# Patient Record
Sex: MALE | Race: WHITE | NOT HISPANIC OR LATINO | URBAN - METROPOLITAN AREA
[De-identification: names, ages, dates, MRNs, and addresses within clinical notes are randomized per-mention and may not be internally consistent; named-entity substitution may affect disease eponyms.]

---

## 2019-01-12 ENCOUNTER — EMERGENCY (EMERGENCY)
Facility: HOSPITAL | Age: 59
LOS: 1 days | Discharge: HOME | End: 2019-01-12
Attending: EMERGENCY MEDICINE
Payer: COMMERCIAL

## 2019-01-12 VITALS
HEIGHT: 73 IN | DIASTOLIC BLOOD PRESSURE: 109 MMHG | HEART RATE: 75 BPM | WEIGHT: 265 LBS | RESPIRATION RATE: 18 BRPM | OXYGEN SATURATION: 98 % | SYSTOLIC BLOOD PRESSURE: 177 MMHG | TEMPERATURE: 98 F

## 2019-01-12 VITALS
HEART RATE: 76 BPM | RESPIRATION RATE: 16 BRPM | OXYGEN SATURATION: 98 % | SYSTOLIC BLOOD PRESSURE: 162 MMHG | DIASTOLIC BLOOD PRESSURE: 99 MMHG

## 2019-01-12 PROCEDURE — 73610 X-RAY EXAM OF ANKLE: CPT | Mod: 26,LT

## 2019-01-12 PROCEDURE — 76882 US LMTD JT/FCL EVL NVASC XTR: CPT | Mod: 26,LT

## 2019-01-12 PROCEDURE — 99284 EMERGENCY DEPT VISIT MOD MDM: CPT | Mod: 25

## 2019-01-12 PROCEDURE — 99284 EMERGENCY DEPT VISIT MOD MDM: CPT

## 2019-01-12 PROCEDURE — 76882 US LMTD JT/FCL EVL NVASC XTR: CPT

## 2019-01-12 PROCEDURE — 73610 X-RAY EXAM OF ANKLE: CPT

## 2019-01-12 NOTE — ED PROVIDER NOTE - NS ED ROS FT
Constitutional: denies fevers, chills, night sweats, weight loss  HEENT: denies visual changes, hearing changes, rhinitis, odynophagia, or dysphagia  Cardiovascular: denies palpitations, chest pain, edema  Respiratory: denies SOB, wheezing  Gastrointestinal: denies N/V/D, abdominal pain, hematochezia, melena  : denies dysuria, hematuria  MSK: + L heel pain, - back pain,  Neuro: no numbness or tingling  Psych: no depression or anxiety  Skin: denies new rashes or masses

## 2019-01-12 NOTE — ED ADULT NURSE NOTE - OBJECTIVE STATEMENT
Seen by, treated by MD Hema Negron Seen by, treated by,  discharged by  MD Hema Negron Seen by, treated by,  discharged by  MD Hema Negron and ED MD Declan Linda with no nursing intervention needed

## 2019-01-12 NOTE — ED PROVIDER NOTE - MEDICAL DECISION MAKING DETAILS
Jamil PGY1- 58 yoM, HTN, L heel pain 3 days, wore boots that were too tight, no trauma taking ibuprofen and using ice which helped, ambulatory, no fever, recent illness, abx use  tender at achillnes tendon insertion, normal ROM, no erythema, no bony stepoff, no deformity,  lungs CTA, RRR, normal neuro exam  declined pain meds, L ankle radiograph  ddx: achilles tendinitis, radiograph to r/o avulsion fx,

## 2019-01-12 NOTE — ED ADULT NURSE NOTE - NSIMPLEMENTINTERV_GEN_ALL_ED
Implemented All Universal Safety Interventions:  [place] to call system. Call bell, personal items and telephone within reach. Instruct patient to call for assistance. Room bathroom lighting operational. Non-slip footwear when patient is off stretcher. Physically safe environment: no spills, clutter or unnecessary equipment. Stretcher in lowest position, wheels locked, appropriate side rails in place.

## 2019-01-12 NOTE — ED PROVIDER NOTE - OBJECTIVE STATEMENT
58 yoM, HTN, otherwise healthy, presents to ED with L heel pain that began 2 days ago. Was wearing new work boots that he believes he strapped too tight at the ankle. Noticed pain in L heel that developed. No trauma, fall, or injury. No fever/chills or recent illness. No recent abx use. Pain improved with ice and ibuprofen. Feels better when wearing flat normal shoe. Reports he can still ambulate just with pain. No prior injuries to this foot. Does not drink., smoke or use drugs. 58 yoM, HTN, otherwise healthy, presents to ED with L heel pain that began 2 days ago. Was wearing new work boots that he believes he strapped too tight at the ankle. Noticed pain in L heel that developed. No trauma, fall, or injury. No fever/chills or recent illness. No recent abx use. Pain improved with ice and ibuprofen. Feels better when wearing flat normal shoe. Reports he can still ambulate just with pain. No prior injuries to this foot. Does not drink, smoke or use drugs. 58 yoM, HTN, otherwise healthy, presents to ED with L heel pain that began 2 days ago. Was wearing new work boots that he believes he strapped too tight at the ankle. Noticed pain in L heel that developed. No trauma, fall, or injury. No fever/chills or recent illness. No recent abx use. Pain improved with ice and ibuprofen. Feels better when wearing flat normal shoe and tennis shoe which feels better than walking barefoot. Reports he can still ambulate just with pain. No prior injuries to this foot. Does not drink, smoke or use drugs.

## 2019-01-12 NOTE — ED PROVIDER NOTE - NSFOLLOWUPCLINICS_GEN_ALL_ED_FT
[facility]  Podiatry  [address] [place] [address] - [address] [place] [address]  Phone: [phone]  Fax:   Follow Up Time:

## 2019-01-12 NOTE — ED PROVIDER NOTE - NSFOLLOWUPINSTRUCTIONS_ED_ALL_ED_FT
You likely have achilles tendonitis, possibly from your boots. Your XRAY did not show any fractures.   Take Tylenol and Ibuprofen are directed on bottle.    Podiatry followup is attached.

## 2019-01-12 NOTE — ED PROVIDER NOTE - PHYSICAL EXAMINATION
PHYSICAL EXAM:  GENERAL: NAD, well-groomed, well-developed  HEAD:  Atraumatic, Normocephalic  EYES: EOMI, PERRLA, conjunctiva and sclera clear  ENMT: No tonsillar erythema, exudates, or enlargement; Moist mucous membranes  NECK: Supple, No JVD  HEART: Regular rate and rhythm; No murmurs, rubs, or gallops  RESPIRATORY: CTA B/L, No W/R/R  ABDOMEN: Soft, Nontender, Nondistended  BACK: no cvaT, no midline tenderness, normal ROM  NEURO: A&Ox3, nonfocal, moving all extremities  EXTREMITIES:  L ankle: tenderness to L calcaneus, at point of achilles tendon insertion, skin is intact, joint is stable, no erythema, no tenderness to foot or distal tibia, full ROM of ankle   2+ Peripheral Pulses, No clubbing, cyanosis, or edema  SKIN: warm, dry, normal color, no rash or abnormal lesions

## 2019-01-12 NOTE — ED PROVIDER NOTE - ATTENDING CONTRIBUTION TO CARE
Patient with purchase of new boots and has been wearing them tightly at the ankle starting 2 days ago he has had worsening left ankle/calcaneal pain. Patient states worse when he is barefoot or wearing the boots. No f/c/n/v/warm to area. patient states better with ibuprofen and ice.  patient with tenderness to palpation over insertion of left achilles  no warmth, no redness/erythema to left ankle, non-tachycardic, non-tachypneic  no acute distress, pleasant, with capacity and insight   cooperative,   alert  trachea midline  no pain to calf  no edema  concern for left achilles tendonitis vs partial avulsion vs partial tear of achilles  will offer analgesia and xray ankle and will ultrasound as well  xray and ultrasound within normal limits with ? edema and signs of tendonitis at the insertion of the distal achilles over patient's area of pain  will encourage patient to Take Tylenol 1g every six hours and supplement with ibuprofen 600mg, with food, every six hours which can be taken three hours apart from the Tylenol to have a layered effect   will have patient  follow up with podiatry

## 2019-01-12 NOTE — ED PROVIDER NOTE - CARE PLAN
Principal Discharge DX:	Tendonitis  Goal:	pain at the insertion of the achilles, initial encounter  Secondary Diagnosis:	Tendonitis of ankle, left

## 2021-01-27 ENCOUNTER — TRANSCRIPTION ENCOUNTER (OUTPATIENT)
Age: 61
End: 2021-01-27

## 2021-02-10 ENCOUNTER — TRANSCRIPTION ENCOUNTER (OUTPATIENT)
Age: 61
End: 2021-02-10

## 2021-08-08 ENCOUNTER — TRANSCRIPTION ENCOUNTER (OUTPATIENT)
Age: 61
End: 2021-08-08

## 2021-10-07 ENCOUNTER — TRANSCRIPTION ENCOUNTER (OUTPATIENT)
Age: 61
End: 2021-10-07

## 2023-06-09 ENCOUNTER — NON-APPOINTMENT (OUTPATIENT)
Age: 63
End: 2023-06-09

## 2023-07-24 PROBLEM — I10 ESSENTIAL (PRIMARY) HYPERTENSION: Chronic | Status: ACTIVE | Noted: 2019-01-12

## 2023-07-25 PROBLEM — Z00.00 ENCOUNTER FOR PREVENTIVE HEALTH EXAMINATION: Status: ACTIVE | Noted: 2023-07-25

## 2023-07-26 ENCOUNTER — OUTPATIENT (OUTPATIENT)
Dept: OUTPATIENT SERVICES | Facility: HOSPITAL | Age: 63
LOS: 1 days | End: 2023-07-26
Payer: MEDICARE

## 2023-07-26 ENCOUNTER — APPOINTMENT (OUTPATIENT)
Dept: CT IMAGING | Facility: HOSPITAL | Age: 63
End: 2023-07-26
Payer: MEDICARE

## 2023-07-26 DIAGNOSIS — R10.30 LOWER ABDOMINAL PAIN, UNSPECIFIED: ICD-10-CM

## 2023-07-26 PROCEDURE — 74177 CT ABD & PELVIS W/CONTRAST: CPT | Mod: 26,MH

## 2023-07-26 PROCEDURE — 74177 CT ABD & PELVIS W/CONTRAST: CPT | Mod: MH

## 2023-12-09 ENCOUNTER — NON-APPOINTMENT (OUTPATIENT)
Age: 63
End: 2023-12-09

## 2023-12-10 ENCOUNTER — EMERGENCY (EMERGENCY)
Facility: HOSPITAL | Age: 63
LOS: 1 days | Discharge: HOME | End: 2023-12-10
Attending: STUDENT IN AN ORGANIZED HEALTH CARE EDUCATION/TRAINING PROGRAM | Admitting: STUDENT IN AN ORGANIZED HEALTH CARE EDUCATION/TRAINING PROGRAM
Payer: MEDICARE

## 2023-12-10 VITALS
TEMPERATURE: 98 F | HEART RATE: 72 BPM | WEIGHT: 265 LBS | OXYGEN SATURATION: 98 % | SYSTOLIC BLOOD PRESSURE: 163 MMHG | RESPIRATION RATE: 18 BRPM | DIASTOLIC BLOOD PRESSURE: 96 MMHG

## 2023-12-10 VITALS
DIASTOLIC BLOOD PRESSURE: 80 MMHG | SYSTOLIC BLOOD PRESSURE: 147 MMHG | OXYGEN SATURATION: 98 % | HEART RATE: 71 BPM | RESPIRATION RATE: 17 BRPM

## 2023-12-10 LAB
ALBUMIN SERPL ELPH-MCNC: 4.1 G/DL — SIGNIFICANT CHANGE UP (ref 3.3–5)
ALBUMIN SERPL ELPH-MCNC: 4.1 G/DL — SIGNIFICANT CHANGE UP (ref 3.3–5)
ALP SERPL-CCNC: 66 U/L — SIGNIFICANT CHANGE UP (ref 40–120)
ALP SERPL-CCNC: 66 U/L — SIGNIFICANT CHANGE UP (ref 40–120)
ALT FLD-CCNC: 18 U/L — SIGNIFICANT CHANGE UP (ref 10–45)
ALT FLD-CCNC: 18 U/L — SIGNIFICANT CHANGE UP (ref 10–45)
ANION GAP SERPL CALC-SCNC: 13 MMOL/L — SIGNIFICANT CHANGE UP (ref 5–17)
ANION GAP SERPL CALC-SCNC: 13 MMOL/L — SIGNIFICANT CHANGE UP (ref 5–17)
AST SERPL-CCNC: 19 U/L — SIGNIFICANT CHANGE UP (ref 10–40)
AST SERPL-CCNC: 19 U/L — SIGNIFICANT CHANGE UP (ref 10–40)
BASOPHILS # BLD AUTO: 0.04 K/UL — SIGNIFICANT CHANGE UP (ref 0–0.2)
BASOPHILS # BLD AUTO: 0.04 K/UL — SIGNIFICANT CHANGE UP (ref 0–0.2)
BASOPHILS NFR BLD AUTO: 0.4 % — SIGNIFICANT CHANGE UP (ref 0–2)
BASOPHILS NFR BLD AUTO: 0.4 % — SIGNIFICANT CHANGE UP (ref 0–2)
BILIRUB SERPL-MCNC: 1.2 MG/DL — SIGNIFICANT CHANGE UP (ref 0.2–1.2)
BILIRUB SERPL-MCNC: 1.2 MG/DL — SIGNIFICANT CHANGE UP (ref 0.2–1.2)
BUN SERPL-MCNC: 13 MG/DL — SIGNIFICANT CHANGE UP (ref 7–23)
BUN SERPL-MCNC: 13 MG/DL — SIGNIFICANT CHANGE UP (ref 7–23)
CALCIUM SERPL-MCNC: 9.4 MG/DL — SIGNIFICANT CHANGE UP (ref 8.4–10.5)
CALCIUM SERPL-MCNC: 9.4 MG/DL — SIGNIFICANT CHANGE UP (ref 8.4–10.5)
CHLORIDE SERPL-SCNC: 99 MMOL/L — SIGNIFICANT CHANGE UP (ref 96–108)
CHLORIDE SERPL-SCNC: 99 MMOL/L — SIGNIFICANT CHANGE UP (ref 96–108)
CO2 SERPL-SCNC: 25 MMOL/L — SIGNIFICANT CHANGE UP (ref 22–31)
CO2 SERPL-SCNC: 25 MMOL/L — SIGNIFICANT CHANGE UP (ref 22–31)
CREAT SERPL-MCNC: 0.94 MG/DL — SIGNIFICANT CHANGE UP (ref 0.5–1.3)
CREAT SERPL-MCNC: 0.94 MG/DL — SIGNIFICANT CHANGE UP (ref 0.5–1.3)
EGFR: 90 ML/MIN/1.73M2 — SIGNIFICANT CHANGE UP
EGFR: 90 ML/MIN/1.73M2 — SIGNIFICANT CHANGE UP
EOSINOPHIL # BLD AUTO: 0.03 K/UL — SIGNIFICANT CHANGE UP (ref 0–0.5)
EOSINOPHIL # BLD AUTO: 0.03 K/UL — SIGNIFICANT CHANGE UP (ref 0–0.5)
EOSINOPHIL NFR BLD AUTO: 0.3 % — SIGNIFICANT CHANGE UP (ref 0–6)
EOSINOPHIL NFR BLD AUTO: 0.3 % — SIGNIFICANT CHANGE UP (ref 0–6)
GLUCOSE SERPL-MCNC: 108 MG/DL — HIGH (ref 70–99)
GLUCOSE SERPL-MCNC: 108 MG/DL — HIGH (ref 70–99)
HCT VFR BLD CALC: 43.2 % — SIGNIFICANT CHANGE UP (ref 39–50)
HCT VFR BLD CALC: 43.2 % — SIGNIFICANT CHANGE UP (ref 39–50)
HGB BLD-MCNC: 15.5 G/DL — SIGNIFICANT CHANGE UP (ref 13–17)
HGB BLD-MCNC: 15.5 G/DL — SIGNIFICANT CHANGE UP (ref 13–17)
IMM GRANULOCYTES NFR BLD AUTO: 0.3 % — SIGNIFICANT CHANGE UP (ref 0–0.9)
IMM GRANULOCYTES NFR BLD AUTO: 0.3 % — SIGNIFICANT CHANGE UP (ref 0–0.9)
LACTATE SERPL-SCNC: 0.9 MMOL/L — SIGNIFICANT CHANGE UP (ref 0.7–2)
LACTATE SERPL-SCNC: 0.9 MMOL/L — SIGNIFICANT CHANGE UP (ref 0.7–2)
LIDOCAIN IGE QN: 22 U/L — SIGNIFICANT CHANGE UP (ref 16–77)
LIDOCAIN IGE QN: 22 U/L — SIGNIFICANT CHANGE UP (ref 16–77)
LYMPHOCYTES # BLD AUTO: 1.44 K/UL — SIGNIFICANT CHANGE UP (ref 1–3.3)
LYMPHOCYTES # BLD AUTO: 1.44 K/UL — SIGNIFICANT CHANGE UP (ref 1–3.3)
LYMPHOCYTES # BLD AUTO: 13.7 % — SIGNIFICANT CHANGE UP (ref 13–44)
LYMPHOCYTES # BLD AUTO: 13.7 % — SIGNIFICANT CHANGE UP (ref 13–44)
MCHC RBC-ENTMCNC: 31.6 PG — SIGNIFICANT CHANGE UP (ref 27–34)
MCHC RBC-ENTMCNC: 31.6 PG — SIGNIFICANT CHANGE UP (ref 27–34)
MCHC RBC-ENTMCNC: 35.9 GM/DL — SIGNIFICANT CHANGE UP (ref 32–36)
MCHC RBC-ENTMCNC: 35.9 GM/DL — SIGNIFICANT CHANGE UP (ref 32–36)
MCV RBC AUTO: 88.2 FL — SIGNIFICANT CHANGE UP (ref 80–100)
MCV RBC AUTO: 88.2 FL — SIGNIFICANT CHANGE UP (ref 80–100)
MONOCYTES # BLD AUTO: 0.84 K/UL — SIGNIFICANT CHANGE UP (ref 0–0.9)
MONOCYTES # BLD AUTO: 0.84 K/UL — SIGNIFICANT CHANGE UP (ref 0–0.9)
MONOCYTES NFR BLD AUTO: 8 % — SIGNIFICANT CHANGE UP (ref 2–14)
MONOCYTES NFR BLD AUTO: 8 % — SIGNIFICANT CHANGE UP (ref 2–14)
NEUTROPHILS # BLD AUTO: 8.11 K/UL — HIGH (ref 1.8–7.4)
NEUTROPHILS # BLD AUTO: 8.11 K/UL — HIGH (ref 1.8–7.4)
NEUTROPHILS NFR BLD AUTO: 77.3 % — HIGH (ref 43–77)
NEUTROPHILS NFR BLD AUTO: 77.3 % — HIGH (ref 43–77)
NRBC # BLD: 0 /100 WBCS — SIGNIFICANT CHANGE UP (ref 0–0)
NRBC # BLD: 0 /100 WBCS — SIGNIFICANT CHANGE UP (ref 0–0)
PLATELET # BLD AUTO: 220 K/UL — SIGNIFICANT CHANGE UP (ref 150–400)
PLATELET # BLD AUTO: 220 K/UL — SIGNIFICANT CHANGE UP (ref 150–400)
POTASSIUM SERPL-MCNC: 4 MMOL/L — SIGNIFICANT CHANGE UP (ref 3.5–5.3)
POTASSIUM SERPL-MCNC: 4 MMOL/L — SIGNIFICANT CHANGE UP (ref 3.5–5.3)
POTASSIUM SERPL-SCNC: 4 MMOL/L — SIGNIFICANT CHANGE UP (ref 3.5–5.3)
POTASSIUM SERPL-SCNC: 4 MMOL/L — SIGNIFICANT CHANGE UP (ref 3.5–5.3)
PROT SERPL-MCNC: 8.2 G/DL — SIGNIFICANT CHANGE UP (ref 6–8.3)
PROT SERPL-MCNC: 8.2 G/DL — SIGNIFICANT CHANGE UP (ref 6–8.3)
RBC # BLD: 4.9 M/UL — SIGNIFICANT CHANGE UP (ref 4.2–5.8)
RBC # BLD: 4.9 M/UL — SIGNIFICANT CHANGE UP (ref 4.2–5.8)
RBC # FLD: 12 % — SIGNIFICANT CHANGE UP (ref 10.3–14.5)
RBC # FLD: 12 % — SIGNIFICANT CHANGE UP (ref 10.3–14.5)
SODIUM SERPL-SCNC: 137 MMOL/L — SIGNIFICANT CHANGE UP (ref 135–145)
SODIUM SERPL-SCNC: 137 MMOL/L — SIGNIFICANT CHANGE UP (ref 135–145)
WBC # BLD: 10.49 K/UL — SIGNIFICANT CHANGE UP (ref 3.8–10.5)
WBC # BLD: 10.49 K/UL — SIGNIFICANT CHANGE UP (ref 3.8–10.5)
WBC # FLD AUTO: 10.49 K/UL — SIGNIFICANT CHANGE UP (ref 3.8–10.5)
WBC # FLD AUTO: 10.49 K/UL — SIGNIFICANT CHANGE UP (ref 3.8–10.5)

## 2023-12-10 PROCEDURE — 36415 COLL VENOUS BLD VENIPUNCTURE: CPT

## 2023-12-10 PROCEDURE — 99285 EMERGENCY DEPT VISIT HI MDM: CPT

## 2023-12-10 PROCEDURE — 74177 CT ABD & PELVIS W/CONTRAST: CPT | Mod: 26,MA

## 2023-12-10 PROCEDURE — 80053 COMPREHEN METABOLIC PANEL: CPT

## 2023-12-10 PROCEDURE — 83690 ASSAY OF LIPASE: CPT

## 2023-12-10 PROCEDURE — 83605 ASSAY OF LACTIC ACID: CPT

## 2023-12-10 PROCEDURE — 96374 THER/PROPH/DIAG INJ IV PUSH: CPT | Mod: XU

## 2023-12-10 PROCEDURE — 99284 EMERGENCY DEPT VISIT MOD MDM: CPT | Mod: 25

## 2023-12-10 PROCEDURE — 74177 CT ABD & PELVIS W/CONTRAST: CPT | Mod: MA

## 2023-12-10 PROCEDURE — 85025 COMPLETE CBC W/AUTO DIFF WBC: CPT

## 2023-12-10 RX ORDER — AMPICILLIN SODIUM AND SULBACTAM SODIUM 250; 125 MG/ML; MG/ML
3 INJECTION, POWDER, FOR SUSPENSION INTRAMUSCULAR; INTRAVENOUS ONCE
Refills: 0 | Status: COMPLETED | OUTPATIENT
Start: 2023-12-10 | End: 2023-12-10

## 2023-12-10 RX ADMIN — AMPICILLIN SODIUM AND SULBACTAM SODIUM 200 GRAM(S): 250; 125 INJECTION, POWDER, FOR SUSPENSION INTRAMUSCULAR; INTRAVENOUS at 14:55

## 2023-12-10 NOTE — ED PROVIDER NOTE - PHYSICAL EXAMINATION
Vital signs reviewed  GENERAL: Patient nontoxic appearing, NAD  HEAD: NCAT  EYES: Anicteric  ENT: MMM  NECK: Supple, non tender  RESPIRATORY: Normal respiratory effort. CTA B/L. No wheezing, rales, rhonchi  CARDIOVASCULAR: Regular rate and rhythm  ABDOMEN: Soft. Nondistended.   Mild left lower quadrant tenderness to palpation No guarding or rebound. No CVA tenderness.  MUSCULOSKELETAL/EXTREMITIES: Brisk cap refill. 2+ radial pulses. No leg edema.  SKIN:  Warm and dry  NEURO: AAOx3. No gross FND.  PSYCHIATRIC: Cooperative. Affect appropriate.

## 2023-12-10 NOTE — ED PROVIDER NOTE - NSFOLLOWUPINSTRUCTIONS_ED_ALL_ED_FT
Diverticulitis  Body outline showing the stomach and intestines, with a close-up of diverticula on the large intestine.  Diverticulitis happens when poop (stool) and bacteria get trapped in small pouches in the colon called diverticula. These pouches may form if you have a condition called diverticulosis. When the poop and bacteria get trapped, it can cause an infection and inflammation.    Diverticulitis may cause severe stomach pain and diarrhea. It can also lead to tissue damage in your colon. This can cause bleeding or blockage. In some cases, the diverticula may burst (rupture). This can cause infected poop to go into other parts of your abdomen.    What are the causes?  This condition is caused by poop getting trapped in the diverticula. This allows bacteria to grow. It can lead to inflammation and infection.    What increases the risk?  You are more likely to get this condition if you have diverticulosis. You are also more at risk if:  You are overweight or obese.  You do not get enough exercise.  You drink alcohol.  You smoke.  You eat a lot of red meat, such as beef, pork, or lamb.  You do not get enough fiber. Foods high in fiber include fruits, vegetables, beans, nuts, and whole grains.  You are over 40 years of age.  What are the signs or symptoms?  Symptoms of this condition may include:  Pain and tenderness in the abdomen. This pain is often felt on the left side but may occur in other spots.  Fever and chills.  Nausea and vomiting.  Cramping.  Bloating.  Changes in how often you poop.  Blood in your poop.  How is this diagnosed?  This condition is diagnosed based on your medical history and a physical exam. You may also have tests done to make sure there is nothing else causing your condition. These tests may include:  Blood tests.  Tests done on your pee (urine).  A CT scan of the abdomen.  You may need to have a colonoscopy. This is an exam to look at your whole large intestine. During the exam, a tube is put into the opening of your butt (anus) and then moved into your rectum, colon, and other parts of the large intestine.    This exam is done to look at the diverticula. It can also see if there is something else that may be causing your symptoms.    How is this treated?  Most cases are mild and can be treated at home. You may be told to:  Take over-the-counter pain medicine.  Only eat and drink clear liquids.  Take antibiotics.  Rest.  More severe cases may need to be treated at a hospital. Treatment may include:  Not eating or drinking.  Taking pain medicines.  Getting antibiotics through an IV.  Getting fluids and nutrition through an IV.  Surgery.  Follow these instructions at home:  Medicines    Take over-the-counter and prescription medicines only as told by your health care provider. These include fiber supplements, probiotics, and medicines to soften your poop (stool softeners).  If you were prescribed antibiotics, take them as told by your provider. Do not stop using the antibiotic even if you start to feel better.  Ask your provider if the medicine prescribed to you requires you to avoid driving or using machinery.  Eating and drinking    Pear, berries, artichoke, and beans.  Follow the diet told by your provider. You may need to only eat and drink liquids.  After your symptoms get better, you may be able to return to a more normal diet. You may be told to eat at least 25 grams (25 g) of fiber each day. Fiber makes it easier to poop. Healthy sources of fiber include:  Berries. One cup has 4–8 g of fiber.  Beans or lentils. One-half cup has 5–8 g of fiber.  Green vegetables. One cup has 4 g of fiber.  Avoid eating red meat.  General instructions    Do not use any products that contain nicotine or tobacco. These products include cigarettes, chewing tobacco, and vaping devices, such as e-cigarettes. If you need help quitting, ask your provider.  Exercise for at least 30 minutes, 3 times a week. Exercise hard enough to raise your heart rate and break a sweat.  Contact a health care provider if:  Your pain gets worse.  Your pooping does not go back to normal.  Your symptoms do not get better with treatment.  Your symptoms get worse all of a sudden.  You have a fever.  You vomit more than one time.  Your poop is bloody, black, or tarry.  This information is not intended to replace advice given to you by your health care provider. Make sure you discuss any questions you have with your health care provider.

## 2023-12-10 NOTE — ED PROVIDER NOTE - CLINICAL SUMMARY MEDICAL DECISION MAKING FREE TEXT BOX
Differential diagnosis likely diverticulitis, constipation, msk  Abdominal exam without peritoneal signs. No evidence of acute abdomen at this time. Well appearing. Low suspicion for vascular catastrophe, viscus perforation, AAA, Dissection given history, vitals, and exam. Presentation not consistent with other acute, emergent causes of abdominal pain at this time.  Plan: labs, UA, CT AP, pain control, serial reassessment

## 2023-12-10 NOTE — ED ADULT NURSE NOTE - CAS DISCH TRANSFER METHOD
Subjective:      Patient ID: Clau Fleming is a 69 y.o. male Established patient, here for evaluation of the following chief complaint(s):  Chief Complaint   Patient presents with    Diabetes    Hypertension       HPI  68-year-old type II diabetic hyperlipidemic hypertensive male presents to establish continuity with me as his primary care doctor. Type 2 diabetes mellitus without complication, without long-term current use of insulin ([facility])-compliant with metformin 500 mg orally daily and Januvia 100 mg orally daily. His present hemoglobin A1c is 8.7. He acknowledges dietary indiscretion in regards to a diabetic diet. Mixed hyperlipidemia-compliant with Zocor 10 mg daily        Essential hypertension daily-compliant with Ziac 5-6 0.25 mg daily        At present he denies polyuria,  Polydipsia, constitutional, sinus, visual, cardiopulmonary, urologic, gastrointestinal, immunologic/hematologic, musculoskeletal, neurologic,dermatologic, or psychiatric complaints. Current Outpatient Medications on File Prior to Visit   Medication Sig Dispense Refill    bisoprolol-hydroCHLOROthiazide (ZIAC) 5-6.25 MG per tablet Take 1 tablet by mouth daily 90 tablet 3    metFORMIN (GLUCOPHAGE) 500 MG tablet TAKE 2 TABLETS BY MOUTH TWICE A DAY WITH MEALS 360 tablet 3    simvastatin (ZOCOR) 10 MG tablet TAKE 1 TABLET BY MOUTH EVERY DAY 90 tablet 3    SITagliptin (JANUVIA) 100 MG tablet TAKE 1 TABLET BY MOUTH EVERY DAY 90 tablet 3    Multiple Vitamins-Minerals (THERAPEUTIC MULTIVITAMIN-MINERALS) tablet Take 1 tablet by mouth daily      Cyanocobalamin (VITAMIN B 12 PO) Take 2,500 mg by mouth daily      vitamin D (CHOLECALCIFEROL) 50 MCG (2000 UT) TABS tablet Take 2,000 Units by mouth daily      ferrous sulfate 27 MG TABS Take 1 tablet by mouth daily      blood glucose test strips (ACURA BLOOD GLUCOSE TEST) strip Checks 1 time daily. NIDDM--ICD-10 E11.9 100 each 3    Lancets MISC Checks 1 time daily. NIDDM--ICD-10 E11.9 100 each 3    Blood Glucose Monitoring Suppl (ACURA BLOOD GLUCOSE METER) w/Device KIT 1 Device by Does not apply route 2 times daily DISP WITH TEST STRIP AND LANCETS CHECK BID # 200 3 RF 1 kit 0     No current facility-administered medications on file prior to visit. Lisinopril    Review of Systems   Constitutional:  Negative for chills, diaphoresis, fatigue and fever. HENT:  Negative for congestion, dental problem, drooling, ear discharge, ear pain, facial swelling, hearing loss, mouth sores, nosebleeds, postnasal drip, rhinorrhea, sinus pressure, sinus pain, sneezing, sore throat, tinnitus, trouble swallowing and voice change. Eyes:  Negative for photophobia, pain, discharge, redness, itching and visual disturbance. Respiratory:  Negative for apnea, cough, chest tightness, shortness of breath and wheezing. Cardiovascular:  Negative for chest pain, palpitations and leg swelling. Gastrointestinal:  Negative for abdominal distention, abdominal pain, blood in stool, constipation, diarrhea, nausea, rectal pain and vomiting. Endocrine: Negative for cold intolerance, heat intolerance, polydipsia, polyphagia and polyuria. Genitourinary:  Negative for decreased urine volume, difficulty urinating, dysuria, flank pain, frequency, genital sores, hematuria and urgency. Musculoskeletal:  Negative for arthralgias, back pain, gait problem, joint swelling, myalgias, neck pain and neck stiffness. Skin:  Negative for color change, rash and wound. Allergic/Immunologic: Negative for environmental allergies and food allergies. Neurological:  Negative for dizziness, tremors, seizures, syncope, facial asymmetry, speech difficulty, weakness, light-headedness, numbness and headaches. Hematological:  Negative for adenopathy. Does not bruise/bleed easily. Psychiatric/Behavioral:  Negative for agitation, confusion, decreased concentration, hallucinations, self-injury, sleep disturbance and suicidal ideas.  The patient is not nervous/anxious. Objective:   /68   Pulse 79   Resp 14   Ht 5' 10\" (1.778 m)   Wt 176 lb (79.8 kg)   SpO2 97%   BMI 25.25 kg/m²     Physical Exam  Constitutional:       General: He is not in acute distress. Appearance: He is well-developed. HENT:      Head: Normocephalic. Right Ear: External ear normal.      Left Ear: External ear normal.   Eyes:      Conjunctiva/sclera: Conjunctivae normal.   Neck:      Vascular: No JVD. Trachea: No tracheal deviation. Cardiovascular:      Rate and Rhythm: Normal rate and regular rhythm. Heart sounds: Normal heart sounds. Pulmonary:      Effort: Pulmonary effort is normal. No respiratory distress. Breath sounds: Normal breath sounds. No wheezing or rales. Chest:      Chest wall: No tenderness. Abdominal:      General: Bowel sounds are normal. There is no distension. Palpations: Abdomen is soft. There is no mass. Tenderness: There is no abdominal tenderness. There is no guarding or rebound. Musculoskeletal:         General: No tenderness or deformity. Cervical back: Neck supple. Skin:     General: Skin is warm and dry. Coloration: Skin is not pale. Findings: No erythema or rash. Neurological:      Mental Status: He is alert and oriented to person, place, and time. Cranial Nerves: No cranial nerve deficit. Motor: No abnormal muscle tone. Psychiatric:         Thought Content: Thought content normal.         Judgment: Judgment normal.       Assessment:       Diagnosis Orders   1. Type 2 diabetes mellitus without complication, without long-term current use of insulin (HCC)  POCT glycosylated hemoglobin (Hb A1C)      2. Essential hypertension        3. Mixed hyperlipidemia             No results found for: LIPIDPAN, BMP, CMP, CBC, CBCAUTODIF  Plan:      Nadege Bustos was seen today for diabetes and hypertension.     Diagnoses and all orders for this visit:    Type 2 diabetes mellitus without complication, without long-term current use of insulin (HCC)  -     POCT glycosylated hemoglobin (Hb A1C)    Essential hypertension    Mixed hyperlipidemia    Other orders  -     glipiZIDE (GLUCOTROL) 5 MG tablet; Take 1 tablet by mouth 2 times daily  -     Dulaglutide 0.75 MG/0.5ML SOPN; Inject 0.75 mg into the skin once a week  -     Dulaglutide 0.75 MG/0.5ML SOPN; Inject 0.75 mg into the skin once a week       Return in about 9 weeks (around 3/3/2023). On this date 01/02/23 I have spent 30 minutes reviewing previous notes, test results and face to face with the patient discussing the diagnosis and importance of compliance with the treatment plan. Jovana Johnson MD    Please note, this report has been partially produced using speech recognition software  and may cause  and /or contain errors related to that system including grammar, punctuation and spelling as well as words and phrases that may seem inappropriate. If there are questions or concerns please feel free to contact me to clarify. Private car

## 2023-12-10 NOTE — ED ADULT NURSE NOTE - OBJECTIVE STATEMENT
Pt sent in by urgent care for LLQ abdominal pain. pt reports onset of pain 4-5 days ago. pt reports hx of diverticulitis. denies any fevers, chills, n/v/d, cp, sob.

## 2023-12-10 NOTE — ED PROVIDER NOTE - OBJECTIVE STATEMENT
63y M Past medical history of diverticulosis presents to the ER for left lower quadrant pain.  Patient states that for the past few days he is having constant left lower quadrant pain that is mild to moderate nonradiating.  Patient states that he has a history of similar pain in the past and was diagnosed with diverticulosis.  Went to an urgent care today and was instructed to come to the ER.  Denies any fever, chest pain, shortness of breath, vomiting, diarrhea, rash, ill contacts, recent travel.

## 2023-12-10 NOTE — ED ADULT NURSE NOTE - NSFALLUNIVINTERV_ED_ALL_ED
Bed/Stretcher in lowest position, wheels locked, appropriate side rails in place/Call bell, personal items and telephone in reach/Instruct patient to call for assistance before getting out of bed/chair/stretcher/Non-slip footwear applied when patient is off stretcher/[place] to call system/Physically safe environment - no spills, clutter or unnecessary equipment/Purposeful proactive rounding/Room/bathroom lighting operational, light cord in reach Bed/Stretcher in lowest position, wheels locked, appropriate side rails in place/Call bell, personal items and telephone in reach/Instruct patient to call for assistance before getting out of bed/chair/stretcher/Non-slip footwear applied when patient is off stretcher/[place] to call system/Physically safe environment - no spills, clutter or unnecessary equipment/Purposeful proactive rounding/Room/bathroom lighting operational, light cord in reach

## 2023-12-10 NOTE — ED PROVIDER NOTE - PATIENT PORTAL LINK FT
You can access the FollowMyHealth Patient Portal offered by [facility] by registering at the following website: [email or web address]. By joining [employer]’s FollowMyHealth portal, you will also be able to view your health information using other applications (apps) compatible with our system. You can access the FollowMyHealth Patient Portal offered by [facility] by registering at the following website: [email or web address]. By joining [facility]’s FollowMyHealth portal, you will also be able to view your health information using other applications (apps) compatible with our system.

## 2023-12-10 NOTE — ED PROVIDER NOTE - PROGRESS NOTE DETAILS
Patient advised of exam and study findings including diverticulitis and epiploic appendigitis   Patient understands and agrees with the plan of discharge and to follow up with GI  Return precautions discussed.  Patient verbalized full understanding.   Patient comfortable going home.  Strict return precautions to the ER for any worsening abd pain, vomiting or any new concerning sxs to come back to er

## 2023-12-13 NOTE — ED ADULT NURSE NOTE - NSFALLRSKASSESSDT_ED_ALL_ED
Refill request received for estradiol  No protocol    Last office visit: 8/1/2023  Next office visit: none in place  Last refill: --Dr Burgos has ordered in the past for pt. Pt still sees him.Next apt 03/13/2024.   Last labs: 03/08/2023--neg pap.        12-Jan-2019 12:29

## 2023-12-15 NOTE — CHART NOTE - NSCHARTNOTEFT_GEN_A_CORE
63 y o male presenting to the ED on 12/10 complaining of abdominal pain.  SW made a courtesy call to assist in scheduling the recommended gastroenterology follow up appointment and spoke with patient.  Patient reported that he was feeling better and was pleased with his visit to the hospital.  Patient declined assistance with scheduling and will see his GI doctor next week.

## 2023-12-22 ENCOUNTER — APPOINTMENT (OUTPATIENT)
Dept: CT IMAGING | Facility: HOSPITAL | Age: 63
End: 2023-12-22

## 2024-01-04 ENCOUNTER — TRANSCRIPTION ENCOUNTER (OUTPATIENT)
Age: 64
End: 2024-01-04

## 2024-01-04 ENCOUNTER — OUTPATIENT (OUTPATIENT)
Dept: OUTPATIENT SERVICES | Facility: HOSPITAL | Age: 64
LOS: 1 days | End: 2024-01-04
Payer: MEDICARE

## 2024-01-04 VITALS
WEIGHT: 255.07 LBS | TEMPERATURE: 98 F | OXYGEN SATURATION: 97 % | RESPIRATION RATE: 16 BRPM | HEIGHT: 73 IN | HEART RATE: 66 BPM | DIASTOLIC BLOOD PRESSURE: 87 MMHG | SYSTOLIC BLOOD PRESSURE: 154 MMHG

## 2024-01-04 VITALS
SYSTOLIC BLOOD PRESSURE: 116 MMHG | RESPIRATION RATE: 16 BRPM | DIASTOLIC BLOOD PRESSURE: 81 MMHG | HEART RATE: 60 BPM | OXYGEN SATURATION: 95 %

## 2024-01-04 DIAGNOSIS — R93.1 ABNORMAL FINDINGS ON DIAGNOSTIC IMAGING OF HEART AND CORONARY CIRCULATION: ICD-10-CM

## 2024-01-04 LAB
ANION GAP SERPL CALC-SCNC: 13 MMOL/L — SIGNIFICANT CHANGE UP (ref 5–17)
ANION GAP SERPL CALC-SCNC: 13 MMOL/L — SIGNIFICANT CHANGE UP (ref 5–17)
BUN SERPL-MCNC: 8 MG/DL — SIGNIFICANT CHANGE UP (ref 7–23)
BUN SERPL-MCNC: 8 MG/DL — SIGNIFICANT CHANGE UP (ref 7–23)
CALCIUM SERPL-MCNC: 9.7 MG/DL — SIGNIFICANT CHANGE UP (ref 8.4–10.5)
CALCIUM SERPL-MCNC: 9.7 MG/DL — SIGNIFICANT CHANGE UP (ref 8.4–10.5)
CHLORIDE SERPL-SCNC: 98 MMOL/L — SIGNIFICANT CHANGE UP (ref 96–108)
CHLORIDE SERPL-SCNC: 98 MMOL/L — SIGNIFICANT CHANGE UP (ref 96–108)
CO2 SERPL-SCNC: 21 MMOL/L — LOW (ref 22–31)
CO2 SERPL-SCNC: 21 MMOL/L — LOW (ref 22–31)
CREAT SERPL-MCNC: 0.89 MG/DL — SIGNIFICANT CHANGE UP (ref 0.5–1.3)
CREAT SERPL-MCNC: 0.89 MG/DL — SIGNIFICANT CHANGE UP (ref 0.5–1.3)
EGFR: 95 ML/MIN/1.73M2 — SIGNIFICANT CHANGE UP
EGFR: 95 ML/MIN/1.73M2 — SIGNIFICANT CHANGE UP
GLUCOSE SERPL-MCNC: 107 MG/DL — HIGH (ref 70–99)
GLUCOSE SERPL-MCNC: 107 MG/DL — HIGH (ref 70–99)
HCT VFR BLD CALC: 44.8 % — SIGNIFICANT CHANGE UP (ref 39–50)
HCT VFR BLD CALC: 44.8 % — SIGNIFICANT CHANGE UP (ref 39–50)
HGB BLD-MCNC: 16.1 G/DL — SIGNIFICANT CHANGE UP (ref 13–17)
HGB BLD-MCNC: 16.1 G/DL — SIGNIFICANT CHANGE UP (ref 13–17)
MCHC RBC-ENTMCNC: 31.6 PG — SIGNIFICANT CHANGE UP (ref 27–34)
MCHC RBC-ENTMCNC: 31.6 PG — SIGNIFICANT CHANGE UP (ref 27–34)
MCHC RBC-ENTMCNC: 35.9 GM/DL — SIGNIFICANT CHANGE UP (ref 32–36)
MCHC RBC-ENTMCNC: 35.9 GM/DL — SIGNIFICANT CHANGE UP (ref 32–36)
MCV RBC AUTO: 87.8 FL — SIGNIFICANT CHANGE UP (ref 80–100)
MCV RBC AUTO: 87.8 FL — SIGNIFICANT CHANGE UP (ref 80–100)
NRBC # BLD: 0 /100 WBCS — SIGNIFICANT CHANGE UP (ref 0–0)
NRBC # BLD: 0 /100 WBCS — SIGNIFICANT CHANGE UP (ref 0–0)
PLATELET # BLD AUTO: 211 K/UL — SIGNIFICANT CHANGE UP (ref 150–400)
PLATELET # BLD AUTO: 211 K/UL — SIGNIFICANT CHANGE UP (ref 150–400)
POTASSIUM SERPL-MCNC: 4.7 MMOL/L — SIGNIFICANT CHANGE UP (ref 3.5–5.3)
POTASSIUM SERPL-MCNC: 4.7 MMOL/L — SIGNIFICANT CHANGE UP (ref 3.5–5.3)
POTASSIUM SERPL-SCNC: 4.7 MMOL/L — SIGNIFICANT CHANGE UP (ref 3.5–5.3)
POTASSIUM SERPL-SCNC: 4.7 MMOL/L — SIGNIFICANT CHANGE UP (ref 3.5–5.3)
RBC # BLD: 5.1 M/UL — SIGNIFICANT CHANGE UP (ref 4.2–5.8)
RBC # BLD: 5.1 M/UL — SIGNIFICANT CHANGE UP (ref 4.2–5.8)
RBC # FLD: 11.9 % — SIGNIFICANT CHANGE UP (ref 10.3–14.5)
RBC # FLD: 11.9 % — SIGNIFICANT CHANGE UP (ref 10.3–14.5)
SODIUM SERPL-SCNC: 132 MMOL/L — LOW (ref 135–145)
SODIUM SERPL-SCNC: 132 MMOL/L — LOW (ref 135–145)
WBC # BLD: 6.1 K/UL — SIGNIFICANT CHANGE UP (ref 3.8–10.5)
WBC # BLD: 6.1 K/UL — SIGNIFICANT CHANGE UP (ref 3.8–10.5)
WBC # FLD AUTO: 6.1 K/UL — SIGNIFICANT CHANGE UP (ref 3.8–10.5)
WBC # FLD AUTO: 6.1 K/UL — SIGNIFICANT CHANGE UP (ref 3.8–10.5)

## 2024-01-04 PROCEDURE — C1894: CPT

## 2024-01-04 PROCEDURE — 93010 ELECTROCARDIOGRAM REPORT: CPT

## 2024-01-04 PROCEDURE — 93005 ELECTROCARDIOGRAM TRACING: CPT

## 2024-01-04 PROCEDURE — 80048 BASIC METABOLIC PNL TOTAL CA: CPT

## 2024-01-04 PROCEDURE — C1887: CPT

## 2024-01-04 PROCEDURE — C1769: CPT

## 2024-01-04 PROCEDURE — 85027 COMPLETE CBC AUTOMATED: CPT

## 2024-01-04 PROCEDURE — 93458 L HRT ARTERY/VENTRICLE ANGIO: CPT

## 2024-01-04 RX ORDER — AMLODIPINE BESYLATE 2.5 MG/1
1 TABLET ORAL
Refills: 0 | DISCHARGE

## 2024-01-04 RX ORDER — LEVOCETIRIZINE DIHYDROCHLORIDE 0.5 MG/ML
1 SOLUTION ORAL
Refills: 0 | DISCHARGE

## 2024-01-04 RX ORDER — ASPIRIN/CALCIUM CARB/MAGNESIUM 324 MG
1 TABLET ORAL
Refills: 0 | DISCHARGE

## 2024-01-04 RX ORDER — PANTOPRAZOLE SODIUM 20 MG/1
1 TABLET, DELAYED RELEASE ORAL
Refills: 0 | DISCHARGE

## 2024-01-04 RX ORDER — ROSUVASTATIN CALCIUM 5 MG/1
1 TABLET ORAL
Refills: 0 | DISCHARGE

## 2024-01-04 RX ORDER — SODIUM CHLORIDE 9 MG/ML
1000 INJECTION INTRAMUSCULAR; INTRAVENOUS; SUBCUTANEOUS
Refills: 0 | Status: DISCONTINUED | OUTPATIENT
Start: 2024-01-04 | End: 2024-01-18

## 2024-01-04 RX ORDER — SODIUM CHLORIDE 9 MG/ML
250 INJECTION INTRAMUSCULAR; INTRAVENOUS; SUBCUTANEOUS ONCE
Refills: 0 | Status: COMPLETED | OUTPATIENT
Start: 2024-01-04 | End: 2024-01-04

## 2024-01-04 RX ORDER — VALSARTAN 80 MG/1
1 TABLET ORAL
Refills: 0 | DISCHARGE

## 2024-01-04 RX ADMIN — SODIUM CHLORIDE 750 MILLILITER(S): 9 INJECTION INTRAMUSCULAR; INTRAVENOUS; SUBCUTANEOUS at 09:37

## 2024-01-04 RX ADMIN — SODIUM CHLORIDE 75 MILLILITER(S): 9 INJECTION INTRAMUSCULAR; INTRAVENOUS; SUBCUTANEOUS at 09:36

## 2024-01-04 NOTE — ASU DISCHARGE PLAN (ADULT/PEDIATRIC) - NS MD DC FALL RISK RISK
For information on Fall & Injury Prevention, visit: [email or web address] OR  [email or web address] OR  https://www.cdc.gov/steadi/patient.html For information on Fall & Injury Prevention, visit: [email or web address] OR  [email or web address] OR  https://www.cdc.gov/steadi/patient.html

## 2024-01-04 NOTE — H&P CARDIOLOGY - HISTORY OF PRESENT ILLNESS
63 year old male h/o CAD with myocardial bridge, AAA, HTN, HLD, GERD, Lyme Disease 5/2023, who c/o occasional chest pain CT cors revealed circumflex 70% +FFR. Pt presents today for left heart cath.   63 year old male h/o CAD with myocardial bridge, AAA, HTN, HLD, GERD, Lyme Disease 5/2023, who c/o occasional chest pain CT cors revealed circumflex 70% +FFR. Pt presents today for left heart cath.        Cards: Dr James Powell

## 2024-01-04 NOTE — H&P CARDIOLOGY - NSICDXFAMILYHX_GEN_ALL_CORE_FT
FAMILY HISTORY:  Father  Still living? No  FH: HTN (hypertension), Age at diagnosis: Age Unknown    Mother  Still living? No  FH: HTN (hypertension), Age at diagnosis: Age Unknown

## 2024-01-04 NOTE — ASU DISCHARGE PLAN (ADULT/PEDIATRIC) - PROVIDER TOKENS
PROVIDER:[TOKEN:[[id number]],FOLLOWUP:[2 weeks]] PROVIDER:[TOKEN:[[id number]],FOLLOWUP:[2 weeks]]

## 2024-01-04 NOTE — ASU DISCHARGE PLAN (ADULT/PEDIATRIC) - CARE PROVIDER_API CALL
TIKI CALLE  Phone: [phone]  Fax: ()-  Follow Up Time: 2 weeks   TIKI CALLE  Phone: [phone]  Fax: ()-  Follow Up Time: 2 weeks

## 2024-01-04 NOTE — ASU PATIENT PROFILE, ADULT - FALL HARM RISK - UNIVERSAL INTERVENTIONS
Bed in lowest position, wheels locked, appropriate side rails in place/Call bell, personal items and telephone in reach/Instruct patient to call for assistance before getting out of bed or chair/Non-slip footwear when patient is out of bed/[place] to call system/Physically safe environment - no spills, clutter or unnecessary equipment/Purposeful Proactive Rounding/Room/bathroom lighting operational, light cord in reach Bed in lowest position, wheels locked, appropriate side rails in place/Call bell, personal items and telephone in reach/Instruct patient to call for assistance before getting out of bed or chair/Non-slip footwear when patient is out of bed/[place] to call system/Physically safe environment - no spills, clutter or unnecessary equipment/Purposeful Proactive Rounding/Room/bathroom lighting operational, light cord in reach

## 2024-01-04 NOTE — H&P CARDIOLOGY - NSICDXPASTMEDICALHX_GEN_ALL_CORE_FT
PAST MEDICAL HISTORY:  Aneurysm, ascending aorta     Hiatal hernia with GERD     History of BPH     History of diverticulitis     HLD (hyperlipidemia)     HTN (hypertension)     Lyme disease     Myocardial bridge

## 2024-03-13 ENCOUNTER — OUTPATIENT (OUTPATIENT)
Dept: OUTPATIENT SERVICES | Facility: HOSPITAL | Age: 64
LOS: 1 days | End: 2024-03-13
Payer: MEDICARE

## 2024-03-13 ENCOUNTER — APPOINTMENT (OUTPATIENT)
Dept: RADIOLOGY | Facility: HOSPITAL | Age: 64
End: 2024-03-13
Payer: MEDICARE

## 2024-03-13 DIAGNOSIS — M54.50 LOW BACK PAIN, UNSPECIFIED: ICD-10-CM

## 2024-03-13 PROBLEM — Z87.438 PERSONAL HISTORY OF OTHER DISEASES OF MALE GENITAL ORGANS: Chronic | Status: ACTIVE | Noted: 2024-01-04

## 2024-03-13 PROBLEM — K44.9 DIAPHRAGMATIC HERNIA WITHOUT OBSTRUCTION OR GANGRENE: Chronic | Status: ACTIVE | Noted: 2024-01-04

## 2024-03-13 PROBLEM — Q24.5 MALFORMATION OF CORONARY VESSELS: Chronic | Status: ACTIVE | Noted: 2024-01-04

## 2024-03-13 PROBLEM — I71.21 ANEURYSM OF THE ASCENDING AORTA, WITHOUT RUPTURE: Chronic | Status: ACTIVE | Noted: 2024-01-04

## 2024-03-13 PROBLEM — Z87.19 PERSONAL HISTORY OF OTHER DISEASES OF THE DIGESTIVE SYSTEM: Chronic | Status: ACTIVE | Noted: 2024-01-04

## 2024-03-13 PROBLEM — A69.20 LYME DISEASE, UNSPECIFIED: Chronic | Status: ACTIVE | Noted: 2024-01-04

## 2024-03-13 PROBLEM — E78.5 HYPERLIPIDEMIA, UNSPECIFIED: Chronic | Status: ACTIVE | Noted: 2024-01-04

## 2024-03-13 PROCEDURE — 72100 X-RAY EXAM L-S SPINE 2/3 VWS: CPT | Mod: 26

## 2024-03-13 PROCEDURE — 72100 X-RAY EXAM L-S SPINE 2/3 VWS: CPT

## 2024-10-18 ENCOUNTER — OUTPATIENT (OUTPATIENT)
Dept: OUTPATIENT SERVICES | Facility: HOSPITAL | Age: 64
LOS: 1 days | End: 2024-10-18
Payer: MEDICARE

## 2024-10-18 ENCOUNTER — TRANSCRIPTION ENCOUNTER (OUTPATIENT)
Age: 64
End: 2024-10-18

## 2024-10-18 ENCOUNTER — APPOINTMENT (OUTPATIENT)
Dept: CT IMAGING | Facility: HOSPITAL | Age: 64
End: 2024-10-18
Payer: MEDICARE

## 2024-10-18 DIAGNOSIS — K63.89 OTHER SPECIFIED DISEASES OF INTESTINE: ICD-10-CM

## 2024-10-18 PROCEDURE — 74177 CT ABD & PELVIS W/CONTRAST: CPT | Mod: 26,MH

## 2024-11-20 PROCEDURE — 74177 CT ABD & PELVIS W/CONTRAST: CPT

## 2024-12-29 ENCOUNTER — EMERGENCY (EMERGENCY)
Facility: HOSPITAL | Age: 64
LOS: 1 days | Discharge: HOME | End: 2024-12-29
Attending: STUDENT IN AN ORGANIZED HEALTH CARE EDUCATION/TRAINING PROGRAM | Admitting: STUDENT IN AN ORGANIZED HEALTH CARE EDUCATION/TRAINING PROGRAM
Payer: MEDICARE

## 2024-12-29 VITALS
HEIGHT: 73 IN | DIASTOLIC BLOOD PRESSURE: 83 MMHG | RESPIRATION RATE: 18 BRPM | SYSTOLIC BLOOD PRESSURE: 112 MMHG | OXYGEN SATURATION: 99 % | HEART RATE: 71 BPM | TEMPERATURE: 98 F | WEIGHT: 240.08 LBS

## 2024-12-29 LAB
ALBUMIN SERPL ELPH-MCNC: 3.4 G/DL — SIGNIFICANT CHANGE UP (ref 3.3–5)
ALP SERPL-CCNC: 50 U/L — SIGNIFICANT CHANGE UP (ref 40–120)
ALT FLD-CCNC: 39 U/L — SIGNIFICANT CHANGE UP (ref 10–45)
ANION GAP SERPL CALC-SCNC: 7 MMOL/L — SIGNIFICANT CHANGE UP (ref 5–17)
AST SERPL-CCNC: 24 U/L — SIGNIFICANT CHANGE UP (ref 10–40)
BASOPHILS # BLD AUTO: 0.03 K/UL — SIGNIFICANT CHANGE UP (ref 0–0.2)
BASOPHILS NFR BLD AUTO: 0.3 % — SIGNIFICANT CHANGE UP (ref 0–2)
BILIRUB SERPL-MCNC: 1.2 MG/DL — SIGNIFICANT CHANGE UP (ref 0.2–1.2)
BUN SERPL-MCNC: 15 MG/DL — SIGNIFICANT CHANGE UP (ref 7–23)
CALCIUM SERPL-MCNC: 8.5 MG/DL — SIGNIFICANT CHANGE UP (ref 8.4–10.5)
CHLORIDE SERPL-SCNC: 102 MMOL/L — SIGNIFICANT CHANGE UP (ref 96–108)
CO2 SERPL-SCNC: 28 MMOL/L — SIGNIFICANT CHANGE UP (ref 22–31)
CREAT SERPL-MCNC: 0.81 MG/DL — SIGNIFICANT CHANGE UP (ref 0.5–1.3)
D DIMER BLD IA.RAPID-MCNC: 169 NG/ML DDU — SIGNIFICANT CHANGE UP
EGFR: 97 ML/MIN/1.73M2 — SIGNIFICANT CHANGE UP
EOSINOPHIL # BLD AUTO: 0.02 K/UL — SIGNIFICANT CHANGE UP (ref 0–0.5)
EOSINOPHIL NFR BLD AUTO: 0.2 % — SIGNIFICANT CHANGE UP (ref 0–6)
GLUCOSE SERPL-MCNC: 122 MG/DL — HIGH (ref 70–99)
HCT VFR BLD CALC: 37.9 % — LOW (ref 39–50)
HGB BLD-MCNC: 13.5 G/DL — SIGNIFICANT CHANGE UP (ref 13–17)
IMM GRANULOCYTES NFR BLD AUTO: 0.4 % — SIGNIFICANT CHANGE UP (ref 0–0.9)
LYMPHOCYTES # BLD AUTO: 0.98 K/UL — LOW (ref 1–3.3)
LYMPHOCYTES # BLD AUTO: 10.2 % — LOW (ref 13–44)
MCHC RBC-ENTMCNC: 32.5 PG — SIGNIFICANT CHANGE UP (ref 27–34)
MCHC RBC-ENTMCNC: 35.6 G/DL — SIGNIFICANT CHANGE UP (ref 32–36)
MCV RBC AUTO: 91.3 FL — SIGNIFICANT CHANGE UP (ref 80–100)
MONOCYTES # BLD AUTO: 1.02 K/UL — HIGH (ref 0–0.9)
MONOCYTES NFR BLD AUTO: 10.6 % — SIGNIFICANT CHANGE UP (ref 2–14)
NEUTROPHILS # BLD AUTO: 7.52 K/UL — HIGH (ref 1.8–7.4)
NEUTROPHILS NFR BLD AUTO: 78.3 % — HIGH (ref 43–77)
NRBC # BLD: 0 /100 WBCS — SIGNIFICANT CHANGE UP (ref 0–0)
NT-PROBNP SERPL-SCNC: 61 PG/ML — SIGNIFICANT CHANGE UP (ref 0–300)
PLATELET # BLD AUTO: 197 K/UL — SIGNIFICANT CHANGE UP (ref 150–400)
POTASSIUM SERPL-MCNC: 4.1 MMOL/L — SIGNIFICANT CHANGE UP (ref 3.5–5.3)
POTASSIUM SERPL-SCNC: 4.1 MMOL/L — SIGNIFICANT CHANGE UP (ref 3.5–5.3)
PROT SERPL-MCNC: 7.2 G/DL — SIGNIFICANT CHANGE UP (ref 6–8.3)
RBC # BLD: 4.15 M/UL — LOW (ref 4.2–5.8)
RBC # FLD: 12.2 % — SIGNIFICANT CHANGE UP (ref 10.3–14.5)
SODIUM SERPL-SCNC: 137 MMOL/L — SIGNIFICANT CHANGE UP (ref 135–145)
TROPONIN I, HIGH SENSITIVITY RESULT: <4 NG/L — SIGNIFICANT CHANGE UP
WBC # BLD: 9.61 K/UL — SIGNIFICANT CHANGE UP (ref 3.8–10.5)
WBC # FLD AUTO: 9.61 K/UL — SIGNIFICANT CHANGE UP (ref 3.8–10.5)

## 2024-12-29 PROCEDURE — 93005 ELECTROCARDIOGRAM TRACING: CPT

## 2024-12-29 PROCEDURE — 93010 ELECTROCARDIOGRAM REPORT: CPT

## 2024-12-29 PROCEDURE — 96374 THER/PROPH/DIAG INJ IV PUSH: CPT

## 2024-12-29 PROCEDURE — 83880 ASSAY OF NATRIURETIC PEPTIDE: CPT

## 2024-12-29 PROCEDURE — 99285 EMERGENCY DEPT VISIT HI MDM: CPT

## 2024-12-29 PROCEDURE — 73610 X-RAY EXAM OF ANKLE: CPT

## 2024-12-29 PROCEDURE — 80053 COMPREHEN METABOLIC PANEL: CPT

## 2024-12-29 PROCEDURE — 71045 X-RAY EXAM CHEST 1 VIEW: CPT

## 2024-12-29 PROCEDURE — 82962 GLUCOSE BLOOD TEST: CPT

## 2024-12-29 PROCEDURE — 84484 ASSAY OF TROPONIN QUANT: CPT

## 2024-12-29 PROCEDURE — 73610 X-RAY EXAM OF ANKLE: CPT | Mod: 26,LT

## 2024-12-29 PROCEDURE — 71045 X-RAY EXAM CHEST 1 VIEW: CPT | Mod: 26

## 2024-12-29 PROCEDURE — 99285 EMERGENCY DEPT VISIT HI MDM: CPT | Mod: 25

## 2024-12-29 PROCEDURE — 85025 COMPLETE CBC W/AUTO DIFF WBC: CPT

## 2024-12-29 PROCEDURE — 36415 COLL VENOUS BLD VENIPUNCTURE: CPT

## 2024-12-29 PROCEDURE — 85379 FIBRIN DEGRADATION QUANT: CPT

## 2024-12-29 RX ORDER — EVOLOCUMAB 140 MG/ML
0 INJECTION, SOLUTION SUBCUTANEOUS
Refills: 0 | DISCHARGE

## 2024-12-29 RX ORDER — SODIUM CHLORIDE 9 MG/ML
1000 INJECTION, SOLUTION INTRAMUSCULAR; INTRAVENOUS; SUBCUTANEOUS ONCE
Refills: 0 | Status: COMPLETED | OUTPATIENT
Start: 2024-12-29 | End: 2024-12-29

## 2024-12-29 RX ORDER — HYDRALAZINE HYDROCHLORIDE 10 MG/1
1 TABLET ORAL
Refills: 0 | DISCHARGE

## 2024-12-29 RX ORDER — ACETAMINOPHEN 500MG 500 MG/1
1000 TABLET, COATED ORAL ONCE
Refills: 0 | Status: COMPLETED | OUTPATIENT
Start: 2024-12-29 | End: 2024-12-29

## 2024-12-29 RX ORDER — AMLODIPINE AND OLMESARTAN MEDOXOMIL 5; 40 MG/1; MG/1
1 TABLET, FILM COATED ORAL
Refills: 0 | DISCHARGE

## 2024-12-29 RX ADMIN — ACETAMINOPHEN 500MG 400 MILLIGRAM(S): 500 TABLET, COATED ORAL at 09:18

## 2024-12-29 RX ADMIN — SODIUM CHLORIDE 1000 MILLILITER(S): 9 INJECTION, SOLUTION INTRAMUSCULAR; INTRAVENOUS; SUBCUTANEOUS at 09:18

## 2024-12-29 NOTE — ED PROVIDER NOTE - CLINICAL SUMMARY MEDICAL DECISION MAKING FREE TEXT BOX
64-year-old male with past medical history CAD with myocardial bridge, AAA, hypertension, hyperlipidemia, GERD, Lyme disease presented to the ED status post syncopal episode, patient reports he was standing up felt lightheaded and proceeded to sit down and had a syncopal episode, denies hitting head, patient is alert and oriented to person place time, neurologically intact, NIH stroke scale 0.  Patient is well-appearing, reports he had meal at 3 PM yesterday, has not had dinner last night, did not eat breakfast this morning, reports he was going to make coffee prior to feeling episode denies any chest pain shortness of breath or abdominal pain, patient also reports left ankle pain for 1 month and swelling of ankle, denies any history of PE or DVT, cardiac enzymes negative, EKG normal sinus rhythm no ST-T wave changes, D-dimer negative, patient with likely ankle sprain tenderness along ATFL ligament, will provide with Aircast splint and follow-up with outpatient orthopedics, patient advised on hydration, patient also reports he started new blood pressure medication on 12/23 a combination of an ACE inhibitor and amlodipine, differential diagnosis includes blood pressure medication side effect from new medication versus vasovagal episode versus orthostatic, given IV fluids and denies any current dizziness will follow-up with his primary care physician tomorrow for blood pressure recheck and hold off on new medication return precautions discussed verbalized understanding agreeable discharge plan.

## 2024-12-29 NOTE — ED ADULT NURSE NOTE - OBJECTIVE STATEMENT
pt got up and was in the kitchen and felt dizzy and passed out. Low BP initially, now improved, alert, denies further complaints.  skin warm dry color pink. VSS. IV intact. Also reporting intermittent left ankle pain and swelling. Pulses present

## 2024-12-29 NOTE — ED PROVIDER NOTE - WR ORDER NAME 1
Patient received an injection IM of Medroxyprogesterone 150mg/ml 1ml IM into right deltoid. Patient authorized administration. Patient tolerated the injection well. Patient will return for next injection between October 23-November 6, 2023.   Xray Chest 1 View- PORTABLE-Urgent

## 2024-12-29 NOTE — ED PROVIDER NOTE - PATIENT PORTAL LINK FT
You can access the FollowMyHealth Patient Portal offered by [facility] by registering at the following website: [email or web address]. By joining [facility]’s FollowMyHealth portal, you will also be able to view your health information using other applications (apps) compatible with our system.

## 2024-12-29 NOTE — ED PROVIDER NOTE - NSCAREINITIATED _GEN_ER
Rounded on patient. Reinforced importance of getting OOB for all meals, going to bathroom to help prevent blood clots. Reviewed pain medications patient is taking and the importance of keeping pain under control to help with getting OOB and therapy. Encouraged patient monitor for constipation and to take a stool softner/laxative while recovering on pain medication. Also reviewed how to use incentive spirometer with return demonstration by patient. Finally, educated patient on the importance of eating three well balanced meals a day with protein to promote bone/muscle healing. Reminded patient to drink lots of fluids to protect kidneys from all the medications being taken currently with recovery. Patient verbalizing understanding. Dressing to surgical site dry and intact. Ice in place per protocol to right shoulder. Mother in room with patient. Call light in reach.  Patient reminded to call for help to get OOB or when leaving bathroom for safety     Orthopedic  Lidia Perkins(Attending)

## 2024-12-29 NOTE — ED ADULT TRIAGE NOTE - CHIEF COMPLAINT QUOTE
Patient presents to ED with complaint of sayncopal episode this morning, witnessed by wife. Denies hitting head. Patient on Prasugrel. Alert and oriented x 4.

## 2024-12-31 ENCOUNTER — APPOINTMENT (OUTPATIENT)
Dept: ORTHOPEDIC SURGERY | Facility: CLINIC | Age: 64
End: 2024-12-31
Payer: MEDICARE

## 2024-12-31 ENCOUNTER — NON-APPOINTMENT (OUTPATIENT)
Age: 64
End: 2024-12-31

## 2024-12-31 VITALS — BODY MASS INDEX: 31.81 KG/M2 | WEIGHT: 240 LBS | HEIGHT: 73 IN

## 2024-12-31 PROCEDURE — 99203 OFFICE O/P NEW LOW 30 MIN: CPT

## 2025-01-03 NOTE — CHART NOTE - NSCHARTNOTEFT_GEN_A_CORE
64 y o male presenting to the ED on 12/29 with syncope and left ankle pain as per chart.  Per HIE, the patient attended the recommended orthopedic appointment scheduled on 12/31 with Dr. Lopez.

## 2025-01-09 ENCOUNTER — APPOINTMENT (OUTPATIENT)
Dept: ORTHOPEDIC SURGERY | Facility: CLINIC | Age: 65
End: 2025-01-09
Payer: MEDICARE

## 2025-01-09 VITALS — WEIGHT: 240 LBS | BODY MASS INDEX: 31.81 KG/M2 | HEIGHT: 73 IN

## 2025-01-09 DIAGNOSIS — S93.402A SPRAIN OF UNSPECIFIED LIGAMENT OF LEFT ANKLE, INITIAL ENCOUNTER: ICD-10-CM

## 2025-01-09 PROCEDURE — 99213 OFFICE O/P EST LOW 20 MIN: CPT

## 2025-02-05 ENCOUNTER — APPOINTMENT (OUTPATIENT)
Dept: CT IMAGING | Facility: HOSPITAL | Age: 65
End: 2025-02-05

## 2025-02-05 ENCOUNTER — OUTPATIENT (OUTPATIENT)
Dept: OUTPATIENT SERVICES | Facility: HOSPITAL | Age: 65
LOS: 1 days | End: 2025-02-05
Payer: MEDICARE

## 2025-02-05 DIAGNOSIS — I71.21 ANEURYSM OF THE ASCENDING AORTA, WITHOUT RUPTURE: ICD-10-CM

## 2025-02-05 PROCEDURE — G1001: CPT

## 2025-02-05 PROCEDURE — 71275 CT ANGIOGRAPHY CHEST: CPT | Mod: 26

## 2025-02-05 PROCEDURE — 71275 CT ANGIOGRAPHY CHEST: CPT | Mod: MG

## 2025-02-12 ENCOUNTER — OUTPATIENT (OUTPATIENT)
Dept: OUTPATIENT SERVICES | Facility: HOSPITAL | Age: 65
LOS: 1 days | End: 2025-02-12
Payer: MEDICARE

## 2025-02-12 ENCOUNTER — APPOINTMENT (OUTPATIENT)
Dept: ULTRASOUND IMAGING | Facility: HOSPITAL | Age: 65
End: 2025-02-12
Payer: MEDICARE

## 2025-02-12 DIAGNOSIS — E04.1 NONTOXIC SINGLE THYROID NODULE: ICD-10-CM

## 2025-02-12 PROCEDURE — 76536 US EXAM OF HEAD AND NECK: CPT | Mod: 26

## 2025-02-12 PROCEDURE — 76536 US EXAM OF HEAD AND NECK: CPT

## 2025-06-05 ENCOUNTER — INPATIENT (INPATIENT)
Facility: HOSPITAL | Age: 65
LOS: 2 days | Discharge: HOME HEALTH | DRG: 321 | End: 2025-06-08
Attending: STUDENT IN AN ORGANIZED HEALTH CARE EDUCATION/TRAINING PROGRAM | Admitting: STUDENT IN AN ORGANIZED HEALTH CARE EDUCATION/TRAINING PROGRAM
Payer: MEDICARE

## 2025-06-05 ENCOUNTER — EMERGENCY (EMERGENCY)
Facility: HOSPITAL | Age: 65
LOS: 1 days | End: 2025-06-05
Attending: INTERNAL MEDICINE | Admitting: INTERNAL MEDICINE
Payer: MEDICARE

## 2025-06-05 VITALS
OXYGEN SATURATION: 99 % | WEIGHT: 250 LBS | RESPIRATION RATE: 19 BRPM | DIASTOLIC BLOOD PRESSURE: 98 MMHG | SYSTOLIC BLOOD PRESSURE: 161 MMHG | HEART RATE: 70 BPM | TEMPERATURE: 97 F | HEIGHT: 73 IN

## 2025-06-05 VITALS
RESPIRATION RATE: 18 BRPM | HEART RATE: 71 BPM | TEMPERATURE: 99 F | OXYGEN SATURATION: 98 % | HEIGHT: 72 IN | SYSTOLIC BLOOD PRESSURE: 151 MMHG | DIASTOLIC BLOOD PRESSURE: 93 MMHG

## 2025-06-05 VITALS
HEART RATE: 78 BPM | RESPIRATION RATE: 18 BRPM | DIASTOLIC BLOOD PRESSURE: 103 MMHG | OXYGEN SATURATION: 100 % | SYSTOLIC BLOOD PRESSURE: 161 MMHG

## 2025-06-05 DIAGNOSIS — I21.3 ST ELEVATION (STEMI) MYOCARDIAL INFARCTION OF UNSPECIFIED SITE: ICD-10-CM

## 2025-06-05 LAB
ALBUMIN SERPL ELPH-MCNC: 4.2 G/DL — SIGNIFICANT CHANGE UP (ref 3.3–5)
ALBUMIN SERPL ELPH-MCNC: 4.2 G/DL — SIGNIFICANT CHANGE UP (ref 3.3–5)
ALP SERPL-CCNC: 61 U/L — SIGNIFICANT CHANGE UP (ref 40–120)
ALP SERPL-CCNC: 64 U/L — SIGNIFICANT CHANGE UP (ref 40–120)
ALT FLD-CCNC: 29 U/L — SIGNIFICANT CHANGE UP (ref 10–45)
ALT FLD-CCNC: 36 U/L — SIGNIFICANT CHANGE UP (ref 10–45)
ANION GAP SERPL CALC-SCNC: 13 MMOL/L — SIGNIFICANT CHANGE UP (ref 5–17)
ANION GAP SERPL CALC-SCNC: 7 MMOL/L — SIGNIFICANT CHANGE UP (ref 5–17)
APTT BLD: 154.8 SEC — CRITICAL HIGH (ref 26.1–36.8)
AST SERPL-CCNC: 29 U/L — SIGNIFICANT CHANGE UP (ref 10–40)
AST SERPL-CCNC: 63 U/L — HIGH (ref 10–40)
BASOPHILS # BLD AUTO: 0.03 K/UL — SIGNIFICANT CHANGE UP (ref 0–0.2)
BASOPHILS # BLD AUTO: 0.05 K/UL — SIGNIFICANT CHANGE UP (ref 0–0.2)
BASOPHILS NFR BLD AUTO: 0.3 % — SIGNIFICANT CHANGE UP (ref 0–2)
BASOPHILS NFR BLD AUTO: 0.4 % — SIGNIFICANT CHANGE UP (ref 0–2)
BILIRUB SERPL-MCNC: 0.8 MG/DL — SIGNIFICANT CHANGE UP (ref 0.2–1.2)
BILIRUB SERPL-MCNC: 1 MG/DL — SIGNIFICANT CHANGE UP (ref 0.2–1.2)
BLD GP AB SCN SERPL QL: NEGATIVE — SIGNIFICANT CHANGE UP
BUN SERPL-MCNC: 17 MG/DL — SIGNIFICANT CHANGE UP (ref 7–23)
BUN SERPL-MCNC: 17 MG/DL — SIGNIFICANT CHANGE UP (ref 7–23)
CALCIUM SERPL-MCNC: 9.3 MG/DL — SIGNIFICANT CHANGE UP (ref 8.4–10.5)
CALCIUM SERPL-MCNC: 9.4 MG/DL — SIGNIFICANT CHANGE UP (ref 8.4–10.5)
CHLORIDE SERPL-SCNC: 92 MMOL/L — LOW (ref 96–108)
CHLORIDE SERPL-SCNC: 93 MMOL/L — LOW (ref 96–108)
CO2 SERPL-SCNC: 22 MMOL/L — SIGNIFICANT CHANGE UP (ref 22–31)
CO2 SERPL-SCNC: 28 MMOL/L — SIGNIFICANT CHANGE UP (ref 22–31)
CREAT SERPL-MCNC: 0.98 MG/DL — SIGNIFICANT CHANGE UP (ref 0.5–1.3)
CREAT SERPL-MCNC: 1.42 MG/DL — HIGH (ref 0.5–1.3)
EGFR: 54 ML/MIN/1.73M2 — LOW
EGFR: 54 ML/MIN/1.73M2 — LOW
EGFR: 85 ML/MIN/1.73M2 — SIGNIFICANT CHANGE UP
EGFR: 85 ML/MIN/1.73M2 — SIGNIFICANT CHANGE UP
EOSINOPHIL # BLD AUTO: 0.02 K/UL — SIGNIFICANT CHANGE UP (ref 0–0.5)
EOSINOPHIL # BLD AUTO: 0.03 K/UL — SIGNIFICANT CHANGE UP (ref 0–0.5)
EOSINOPHIL NFR BLD AUTO: 0.2 % — SIGNIFICANT CHANGE UP (ref 0–6)
EOSINOPHIL NFR BLD AUTO: 0.3 % — SIGNIFICANT CHANGE UP (ref 0–6)
GLUCOSE SERPL-MCNC: 103 MG/DL — HIGH (ref 70–99)
GLUCOSE SERPL-MCNC: 118 MG/DL — HIGH (ref 70–99)
HCT VFR BLD CALC: 41.9 % — SIGNIFICANT CHANGE UP (ref 39–50)
HCT VFR BLD CALC: 43.3 % — SIGNIFICANT CHANGE UP (ref 39–50)
HGB BLD-MCNC: 14.9 G/DL — SIGNIFICANT CHANGE UP (ref 13–17)
HGB BLD-MCNC: 15.4 G/DL — SIGNIFICANT CHANGE UP (ref 13–17)
IMM GRANULOCYTES NFR BLD AUTO: 0.3 % — SIGNIFICANT CHANGE UP (ref 0–0.9)
IMM GRANULOCYTES NFR BLD AUTO: 0.3 % — SIGNIFICANT CHANGE UP (ref 0–0.9)
INR BLD: 1.02 RATIO — SIGNIFICANT CHANGE UP (ref 0.85–1.16)
INR BLD: 1.05 RATIO — SIGNIFICANT CHANGE UP (ref 0.85–1.16)
LYMPHOCYTES # BLD AUTO: 1.14 K/UL — SIGNIFICANT CHANGE UP (ref 1–3.3)
LYMPHOCYTES # BLD AUTO: 1.38 K/UL — SIGNIFICANT CHANGE UP (ref 1–3.3)
LYMPHOCYTES # BLD AUTO: 11.8 % — LOW (ref 13–44)
LYMPHOCYTES # BLD AUTO: 9.6 % — LOW (ref 13–44)
MAGNESIUM SERPL-MCNC: 1.8 MG/DL — SIGNIFICANT CHANGE UP (ref 1.6–2.6)
MAGNESIUM SERPL-MCNC: 2.2 MG/DL — SIGNIFICANT CHANGE UP (ref 1.6–2.6)
MCHC RBC-ENTMCNC: 31.8 PG — SIGNIFICANT CHANGE UP (ref 27–34)
MCHC RBC-ENTMCNC: 32.4 PG — SIGNIFICANT CHANGE UP (ref 27–34)
MCHC RBC-ENTMCNC: 35.6 G/DL — SIGNIFICANT CHANGE UP (ref 32–36)
MCHC RBC-ENTMCNC: 35.6 G/DL — SIGNIFICANT CHANGE UP (ref 32–36)
MCV RBC AUTO: 89.5 FL — SIGNIFICANT CHANGE UP (ref 80–100)
MCV RBC AUTO: 91.2 FL — SIGNIFICANT CHANGE UP (ref 80–100)
MONOCYTES # BLD AUTO: 0.58 K/UL — SIGNIFICANT CHANGE UP (ref 0–0.9)
MONOCYTES # BLD AUTO: 0.79 K/UL — SIGNIFICANT CHANGE UP (ref 0–0.9)
MONOCYTES NFR BLD AUTO: 4.9 % — SIGNIFICANT CHANGE UP (ref 2–14)
MONOCYTES NFR BLD AUTO: 6.6 % — SIGNIFICANT CHANGE UP (ref 2–14)
NEUTROPHILS # BLD AUTO: 9.67 K/UL — HIGH (ref 1.8–7.4)
NEUTROPHILS # BLD AUTO: 9.87 K/UL — HIGH (ref 1.8–7.4)
NEUTROPHILS NFR BLD AUTO: 82.5 % — HIGH (ref 43–77)
NEUTROPHILS NFR BLD AUTO: 82.8 % — HIGH (ref 43–77)
NRBC BLD AUTO-RTO: 0 /100 WBCS — SIGNIFICANT CHANGE UP (ref 0–0)
NRBC BLD AUTO-RTO: 0 /100 WBCS — SIGNIFICANT CHANGE UP (ref 0–0)
NT-PROBNP SERPL-SCNC: 34 PG/ML — SIGNIFICANT CHANGE UP (ref 0–300)
PHOSPHATE SERPL-MCNC: 3.1 MG/DL — SIGNIFICANT CHANGE UP (ref 2.5–4.5)
PLATELET # BLD AUTO: 209 K/UL — SIGNIFICANT CHANGE UP (ref 150–400)
PLATELET # BLD AUTO: 210 K/UL — SIGNIFICANT CHANGE UP (ref 150–400)
POTASSIUM SERPL-MCNC: 3.9 MMOL/L — SIGNIFICANT CHANGE UP (ref 3.5–5.3)
POTASSIUM SERPL-MCNC: 4.1 MMOL/L — SIGNIFICANT CHANGE UP (ref 3.5–5.3)
POTASSIUM SERPL-SCNC: 3.9 MMOL/L — SIGNIFICANT CHANGE UP (ref 3.5–5.3)
POTASSIUM SERPL-SCNC: 4.1 MMOL/L — SIGNIFICANT CHANGE UP (ref 3.5–5.3)
PROT SERPL-MCNC: 7.4 G/DL — SIGNIFICANT CHANGE UP (ref 6–8.3)
PROT SERPL-MCNC: 8 G/DL — SIGNIFICANT CHANGE UP (ref 6–8.3)
PROTHROM AB SERPL-ACNC: 12 SEC — SIGNIFICANT CHANGE UP (ref 9.9–13.4)
PROTHROM AB SERPL-ACNC: 12.1 SEC — SIGNIFICANT CHANGE UP (ref 9.9–13.4)
RBC # BLD: 4.68 M/UL — SIGNIFICANT CHANGE UP (ref 4.2–5.8)
RBC # BLD: 4.75 M/UL — SIGNIFICANT CHANGE UP (ref 4.2–5.8)
RBC # FLD: 11.9 % — SIGNIFICANT CHANGE UP (ref 10.3–14.5)
RBC # FLD: 12 % — SIGNIFICANT CHANGE UP (ref 10.3–14.5)
RH IG SCN BLD-IMP: POSITIVE — SIGNIFICANT CHANGE UP
SODIUM SERPL-SCNC: 127 MMOL/L — LOW (ref 135–145)
SODIUM SERPL-SCNC: 128 MMOL/L — LOW (ref 135–145)
TROPONIN I, HIGH SENSITIVITY RESULT: 11.2 NG/L — SIGNIFICANT CHANGE UP
WBC # BLD: 11.72 K/UL — HIGH (ref 3.8–10.5)
WBC # BLD: 11.91 K/UL — HIGH (ref 3.8–10.5)
WBC # FLD AUTO: 11.72 K/UL — HIGH (ref 3.8–10.5)
WBC # FLD AUTO: 11.91 K/UL — HIGH (ref 3.8–10.5)

## 2025-06-05 PROCEDURE — 93010 ELECTROCARDIOGRAM REPORT: CPT

## 2025-06-05 PROCEDURE — 99152 MOD SED SAME PHYS/QHP 5/>YRS: CPT

## 2025-06-05 PROCEDURE — 36415 COLL VENOUS BLD VENIPUNCTURE: CPT

## 2025-06-05 PROCEDURE — 84484 ASSAY OF TROPONIN QUANT: CPT

## 2025-06-05 PROCEDURE — 92941 PRQ TRLML REVSC TOT OCCL AMI: CPT | Mod: LC

## 2025-06-05 PROCEDURE — 83880 ASSAY OF NATRIURETIC PEPTIDE: CPT

## 2025-06-05 PROCEDURE — 71045 X-RAY EXAM CHEST 1 VIEW: CPT

## 2025-06-05 PROCEDURE — 99291 CRITICAL CARE FIRST HOUR: CPT

## 2025-06-05 PROCEDURE — 83735 ASSAY OF MAGNESIUM: CPT

## 2025-06-05 PROCEDURE — 93005 ELECTROCARDIOGRAM TRACING: CPT

## 2025-06-05 PROCEDURE — 85610 PROTHROMBIN TIME: CPT

## 2025-06-05 PROCEDURE — 92928 PRQ TCAT PLMT NTRAC ST 1 LES: CPT | Mod: LM

## 2025-06-05 PROCEDURE — 71045 X-RAY EXAM CHEST 1 VIEW: CPT | Mod: 26

## 2025-06-05 PROCEDURE — 99291 CRITICAL CARE FIRST HOUR: CPT | Mod: 25

## 2025-06-05 PROCEDURE — 80053 COMPREHEN METABOLIC PANEL: CPT

## 2025-06-05 PROCEDURE — 96374 THER/PROPH/DIAG INJ IV PUSH: CPT

## 2025-06-05 PROCEDURE — 85025 COMPLETE CBC W/AUTO DIFF WBC: CPT

## 2025-06-05 PROCEDURE — 93458 L HRT ARTERY/VENTRICLE ANGIO: CPT | Mod: 26,59

## 2025-06-05 RX ORDER — HEPARIN SODIUM 1000 [USP'U]/ML
INJECTION INTRAVENOUS; SUBCUTANEOUS
Qty: 25000 | Refills: 0 | Status: DISCONTINUED | OUTPATIENT
Start: 2025-06-05 | End: 2025-06-09

## 2025-06-05 RX ORDER — HEPARIN SODIUM 1000 [USP'U]/ML
4000 INJECTION INTRAVENOUS; SUBCUTANEOUS ONCE
Refills: 0 | Status: COMPLETED | OUTPATIENT
Start: 2025-06-05 | End: 2025-06-05

## 2025-06-05 RX ORDER — BUSPIRONE HYDROCHLORIDE 15 MG/1
1 TABLET ORAL
Refills: 0 | DISCHARGE

## 2025-06-05 RX ORDER — ATORVASTATIN CALCIUM 80 MG/1
80 TABLET, FILM COATED ORAL AT BEDTIME
Refills: 0 | Status: DISCONTINUED | OUTPATIENT
Start: 2025-06-05 | End: 2025-06-05

## 2025-06-05 RX ORDER — MAGNESIUM, ALUMINUM HYDROXIDE 200-200 MG
30 TABLET,CHEWABLE ORAL ONCE
Refills: 0 | Status: COMPLETED | OUTPATIENT
Start: 2025-06-05 | End: 2025-06-05

## 2025-06-05 RX ORDER — CLOPIDOGREL BISULFATE 75 MG/1
600 TABLET, FILM COATED ORAL ONCE
Refills: 0 | Status: COMPLETED | OUTPATIENT
Start: 2025-06-05 | End: 2025-06-05

## 2025-06-05 RX ORDER — HEPARIN SODIUM 1000 [USP'U]/ML
4000 INJECTION INTRAVENOUS; SUBCUTANEOUS EVERY 6 HOURS
Refills: 0 | Status: DISCONTINUED | OUTPATIENT
Start: 2025-06-05 | End: 2025-06-09

## 2025-06-05 RX ORDER — CLOPIDOGREL BISULFATE 75 MG/1
75 TABLET, FILM COATED ORAL DAILY
Refills: 0 | Status: DISCONTINUED | OUTPATIENT
Start: 2025-06-06 | End: 2025-06-08

## 2025-06-05 RX ORDER — NITROGLYCERIN 20 MG/G
1 OINTMENT TOPICAL ONCE
Refills: 0 | Status: COMPLETED | OUTPATIENT
Start: 2025-06-05 | End: 2025-06-05

## 2025-06-05 RX ORDER — BUSPIRONE HYDROCHLORIDE 15 MG/1
10 TABLET ORAL
Refills: 0 | Status: DISCONTINUED | OUTPATIENT
Start: 2025-06-05 | End: 2025-06-08

## 2025-06-05 RX ORDER — EVOLOCUMAB 140 MG/ML
140 INJECTION, SOLUTION SUBCUTANEOUS
Refills: 0 | DISCHARGE

## 2025-06-05 RX ORDER — ASPIRIN 325 MG
81 TABLET ORAL DAILY
Refills: 0 | Status: DISCONTINUED | OUTPATIENT
Start: 2025-06-05 | End: 2025-06-08

## 2025-06-05 RX ORDER — METOPROLOL SUCCINATE 50 MG/1
12.5 TABLET, EXTENDED RELEASE ORAL EVERY 12 HOURS
Refills: 0 | Status: DISCONTINUED | OUTPATIENT
Start: 2025-06-05 | End: 2025-06-06

## 2025-06-05 RX ADMIN — Medication 30 MILLILITER(S): at 23:08

## 2025-06-05 RX ADMIN — BUSPIRONE HYDROCHLORIDE 10 MILLIGRAM(S): 15 TABLET ORAL at 21:16

## 2025-06-05 RX ADMIN — NITROGLYCERIN 1 INCH(S): 20 OINTMENT TOPICAL at 16:07

## 2025-06-05 RX ADMIN — CLOPIDOGREL BISULFATE 600 MILLIGRAM(S): 75 TABLET, FILM COATED ORAL at 16:28

## 2025-06-05 RX ADMIN — HEPARIN SODIUM 1000 UNIT(S)/HR: 1000 INJECTION INTRAVENOUS; SUBCUTANEOUS at 16:26

## 2025-06-05 RX ADMIN — Medication 50 MILLIGRAM(S): at 20:02

## 2025-06-05 RX ADMIN — METOPROLOL SUCCINATE 12.5 MILLIGRAM(S): 50 TABLET, EXTENDED RELEASE ORAL at 22:59

## 2025-06-05 RX ADMIN — HEPARIN SODIUM 4000 UNIT(S): 1000 INJECTION INTRAVENOUS; SUBCUTANEOUS at 16:26

## 2025-06-05 NOTE — H&P ADULT - NSICDXPASTMEDICALHX_GEN_ALL_CORE_FT
PAST MEDICAL HISTORY:  Aneurysm, ascending aorta     CAD S/P percutaneous coronary angioplasty     Hiatal hernia with GERD     History of BPH     History of diverticulitis     HLD (hyperlipidemia)     HTN (hypertension)     Lyme disease     Myocardial bridge      Azithromycin Counseling:  I discussed with the patient the risks of azithromycin including but not limited to GI upset, allergic reaction, drug rash, diarrhea, and yeast infections.

## 2025-06-05 NOTE — PATIENT PROFILE ADULT - FALL HARM RISK - CONCLUSION
Progress Note - Nephrology   Masoud Perry 68 y.o. male MRN: [id number]  Unit/Bed#: -01 Encounter: [id number]    ASSESSMENT AND PLAN:  68year-old male with a history of chronic lymphedema/hypertension/CVA/atrial fibrillation/chronic diastolic CHF/BPH/diabetes mellitus type 2/nephrolithiasis/ESRD on PD presenting with worsening lower extremity edema     1.  ESRD on PD: Has been on CCPD now CAPD while hospitalized  - Will increase PD to 4.25% to enhance ultrafiltration monitor glucose     2.  Acute on chronic diastolic CHF with lower extremity edema: Part of this is lymphedema.  No evidence of DVT by venous duplex; there is cellulitis of right lower extremity  - Volume improved most of the lower extremity edema is lymphedema and cellulitis so we will stop Lasix infusion and switch to oral torsemide 40 mg twice daily  Will check chest x-ray given mild abnormality  - CCPD please see above     3.  Electrolytes/acid-base: Replete hypokalemia especially in the setting of atrial fibrillation     4.  MBD of ESRD:  - Hyperphosphatemia acceptable currently off binders on renal diet  - Replete hypomagnesemia especially in relation to the atrial fibrillation     5.  Anemia of ESRD:  - Hemoglobin 7.2 likely lower  - 1 dose Epogen 20,000 units subcu  - Check stool for occult blood  - On oral iron cannot give intravenous iron given infection  - Transfuse as needed for hemoglobin less than 7.0 per primary service     6.  Ongoing problems:  - Enterobacter on blood culture with right lower extremity cellulitis per infectious disease: PD cell count only 8 no suggestion of peritonitis  - Atrial fibrillation per cardiology consultation     Discussed with primary service as a consequence/result we will adjust PD exchanges and check a chest x-ray      Subjective:   Patient complaining of chest tightness in his throat with rapid ventricular rate atrial fibrillation  No shortness of breath  No abdominal discomfort or nausea vomiting or  "diarrhea tolerating PD  Urinating well    Objective:     Vitals: Blood pressure 102/57, pulse (!) 133, temperature 97.6 °F (36.4 °C), temperature source Oral, resp. rate (!) 23, height 5' 10\" (1.778 m), weight 117 kg (258 lb 13.1 oz), SpO2 95%.,Body mass index is 37.14 kg/m².    Weight (last 2 days)       Date/Time Weight    07/26/24 0500 117 (258.82)    07/25/24 0300 116 (256.62)     Weight: new bed; no PD fluid instilled at 07/25/24 0300    07/24/24 0400 120 (263.45)              Intake/Output Summary (Last 24 hours) at 7/26/2024 0942  Last data filed at 7/26/2024 0303  Gross per 24 hour   Intake 579.53 ml   Output 1575 ml   Net -995.47 ml            Physical Exam: General: Complaining of feeling weak and chest tightness as outlined  Skin:  No acute rash  Eyes:  No scleral icterus and noninjected  ENT:  Moist mucous membranes  Neck:  Supple, no jugular venous distention, trachea midline, overall appearance is normal  Chest: Slight crackles at the bases, poor effort  CVS: Increased heart rate and irregular without a rub or gallops  Abdomen:  Normal bowel sounds, soft and nontender and nondistended  Extremities: 2+ lower extremity lymphedema significantly improved on the left with wrinkling along with venous stasis changes, ongoing cellulitic changes edema the same on the right, and no cyanosis, no significant arthritic changes  Neuro:  No gross focality  Psych:  Alert and oriented and appropriate                Medications:    Scheduled Meds:  Current Facility-Administered Medications   Medication Dose Route Frequency Provider Last Rate    acetaminophen  650 mg Oral Q6H PRN Rachel Conn PA-C      aspirin  81 mg Oral Daily Rachel Conn PA-C      atorvastatin  40 mg Oral Daily With Dinner Rachel Conn PA-C      docusate sodium  100 mg Oral BID Ofelia Salmeron PA-C      ferrous sulfate  325 mg Oral Daily With Breakfast Jignesh Hussein MD      HYDROcodone-acetaminophen  1 tablet Oral Q6H PRN Rachel" August Conn PA-C      HYDROcodone-acetaminophen  2 tablet Oral Q6H PRN Rachel Conn PA-C      HYDROmorphone  1 mg Intravenous Q3H PRN GAGANDEEP Bernardo      insulin glargine  15 Units Subcutaneous Q12H VASU Rachel Conn PA-C      insulin lispro  1-6 Units Subcutaneous HS Rachel Conn PA-C      insulin lispro  1-6 Units Subcutaneous TID AC Nora Mathew MD      levothyroxine  125 mcg Oral Early Morning Rachel Conn PA-C      magnesium Oxide  400 mg Oral Daily Rachel Conn PA-C      magnesium sulfate  2 g Intravenous Once Jignesh Hussein MD 2 g (07/26/24 0904)    metoprolol succinate  100 mg Oral BID Rachel Conn PA-C      piperacillin-tazobactam  4.5 g Intravenous Q12H Rachel Conn PA-C 4.5 g (07/26/24 0859)    polyethylene glycol  17 g Oral Daily PRN Rachel Conn PA-C      potassium chloride  40 mEq Oral Q4H Jignesh Hussein MD      senna  2 tablet Oral HS Ofelia Salmeron PA-C      simethicone  80 mg Oral 4x Daily PRN Rachel Conn PA-C      tamsulosin  0.4 mg Oral Daily With Dinner Rachel Conn PA-C         PRN Meds:.  acetaminophen    HYDROcodone-acetaminophen    HYDROcodone-acetaminophen    HYDROmorphone    polyethylene glycol    simethicone    Continuous Infusions:     Lab, Imaging and other studies: I have personally reviewed pertinent labs.  Laboratory Results:  Results from last 7 days   Lab Units 07/26/24  0433 07/25/24  0341 07/24/24  1554 07/24/24  1039 07/24/24  0450 07/23/24  1253   WBC Thousand/uL 6.45 6.34  --   --  6.62 9.06   HEMOGLOBIN g/dL 7.2* 7.8*  --  7.5* 7.5* 10.1*   HEMATOCRIT % 23.4* 24.7*  --  23.4* 23.9* 31.7*   PLATELETS Thousands/uL 117* 112*  --   --  101* 134*   POTASSIUM mmol/L 3.2* 3.8 3.2*  --  3.7 3.5   CHLORIDE mmol/L 108 102 102  --  103 99   CO2 mmol/L 22 23 23  --  21 21   BUN mg/dL 67* 85* 83*  --  84* 83*   CREATININE mg/dL 4.70* 5.91* 5.86*  --  5.88* 5.84*   CALCIUM mg/dL 8.7 10.1 9.9  --  9.8 10.8*  "  MAGNESIUM mg/dL 1.8* 2.3  --   --  2.2  --    PHOSPHORUS mg/dL  --  5.1*  --   --  4.9*  --      Urinalysis:   Lab Results   Component Value Date    COLORU Yellow 07/23/2024    CLARITYU Clear 07/23/2024    SPECGRAV 1.020 07/23/2024    PHUR 5.5 07/23/2024    LEUKOCYTESUR Negative 07/23/2024    NITRITE Negative 07/23/2024    GLUCOSEU Negative 07/23/2024    KETONESU Negative 07/23/2024    BILIRUBINUR Negative 07/23/2024    BLOODU Large (A) 07/23/2024     ABGs: No results found for: \"PH\"  Radiology review:     Portions of the record may have been created with voice recognition software.  Occasional wrong word or \"sound a like\" substitutions may have occurred due to the inherent limitations of voice recognition software.  Read the chart carefully and recognize, using context, where substitutions have occurred.                    " Fall with Harm Risk

## 2025-06-05 NOTE — H&P ADULT - NSHPPHYSICALEXAM_GEN_ALL_CORE
CONSTITUTIONAL: Well groomed, no apparent distress  EYES: PERRLA and symmetric, EOMI, No conjunctival or scleral injection, non-icteric  ENMT: Oral mucosa with moist membranes. Normal dentition; no pharyngeal injection or exudates  NECK: Supple, symmetric and without tracheal deviation   RESP: No respiratory distress, no use of accessory muscles; CTA b/l, no WRR  CV: RRR, +S1S2, no MRG; no JVD; no peripheral edema  GI: Soft, NT, ND, no rebound, no guarding; no palpable masses; no hepatosplenomegaly; no hernia palpated  SKIN: No rashes or ulcers noted; no subcutaneous nodules or induration palpable  PSYCH: Appropriate insight/judgment; A+O x 3, mood and affect appropriate, recent/remote memory intact

## 2025-06-05 NOTE — ED PROVIDER NOTE - PRO INTERPRETER NEED 2
"Shoulder Follow Up      Patient: Rosalina Ferrell        YOB: 1950            Chief Complaints: Shoulder pain      History of Present Illness: Patient presents for follow-up about 6 weeks status post right clavicle fracture.  States she has been doing well with her motion pain and function have been improving.  No other complaints.      Physical Exam: 73 y.o. female  General Appearance:    Alert, cooperative, in no acute distress                   Vitals:    05/26/23 0833   Temp: 97.5 °F (36.4 °C)   Weight: 60.4 kg (133 lb 1.6 oz)   Height: 162.6 cm (64\")   PainSc:   5   PainLoc: Shoulder        Patient is alert and read ×3 no acute distress appears her above-listed at height weight and age.  Affect is normal respiratory rate is normal unlabored. Heart rate regular rate rhythm, sclera, dentition and hearing are normal for the purpose of this exam.      Ortho Exam      Right upper extremity examined no tenderness about the clavicle.  Gentle shoulder range of motion well-tolerated.  Motor and sensory intact distally.    Radiographs    2 views right clavicle show interval healing callus formation overall alignment is unchanged.  No other changes from previous films        Assessment/Plan:      6 weeks status post right clavicle fracture    Recommend continuing conservative treatment.  Patient is improving regards to pain and function.  Patient may continue the sling over this next week and begin weaning out of it over the next 2 to 3 weeks.  We will plan to begin some therapy in about a week.  Patient's okay to lift no more than a few pounds over the next few weeks then may slowly progress.  Continue calcium and vitamin D for bone healing health.  Plan for the patient follow-up in 6 weeks.  If there is any change in the interim they will follow-up sooner.  Plan was understood and agreed.            "
English

## 2025-06-05 NOTE — ED PROVIDER NOTE - OBJECTIVE STATEMENT
65-year-old male came to the emergency room chief complaint of chest pain started shortly prior to arrival patient was power washing at home for 4 hours stated that he felt very hot and sweaty came indoors started having chest pain took his aspirin 325 mg x 2 with some relief patient has a known history of coronary artery disease with stents in the circumflex artery

## 2025-06-05 NOTE — PATIENT PROFILE ADULT - FUNCTIONAL ASSESSMENT - BASIC MOBILITY 2.
I did not discuss colon cancer screening with her.  Is she interested in doing colonoscopy.  If not we could also order [place].  Let me know and I can place orders.  SIRISHA   4 = No assist / stand by assistance

## 2025-06-05 NOTE — ED PROVIDER NOTE - CLINICAL SUMMARY MEDICAL DECISION MAKING FREE TEXT BOX
65-year-old male came to the emergency room chief complaint of chest pain started shortly prior to arrival patient was power washing at home for 4 hours stated that he felt very hot and sweaty came indoors started having chest pain took his aspirin 325 mg x 2 with some relief patient has a known history of coronary artery disease with stents in the circumflex artery  EKG shows a STEMI on anterolateral inferior leads patient was given Plavix 600 heparin drip was started transferred to Cath Lab

## 2025-06-05 NOTE — ED ADULT NURSE NOTE - OBJECTIVE STATEMENT
Pt came from home with c/o chest pain Pt came from home with c/o chest pain 1 hr PTA. Pt with hx HTN and cardiac stents. Reports power washing his home for 4 hrs prior to onset of chest pain, Denies palpitations or SOB.

## 2025-06-05 NOTE — ED ADULT TRIAGE NOTE - MODE OF ARRIVAL
[employer] [Postmenopausal] : The patient is postmenopausal [Menarche Age ____] : age at menarche was [unfilled] [Total Preg ___] : G[unfilled] [Live Births ___] : P[unfilled]  [Age At Live Birth ___] : Age at live birth: [unfilled] [History of Hormone Replacement Treatment] : has no history of hormone replacement treatment Walk in

## 2025-06-05 NOTE — ED ADULT NURSE NOTE - TEMPLATE
Initiate Treatment: - spironolactone 50 mg tablet \\nQuantity: 180.0 Tablet  Days Supply: 90\\nSig: Take 1 pill by mouth twice a day\\n\\n- tretinoin 0.05 % topical cream \\nQuantity: 45.0 g  Days Supply: 30\\nSig: Apply to entire face every other night, if too irritating mix with a moisturizer. Increase to nightly as tolerated
Detail Level: Zone
Plan: Stressed sun protection
Cardiac

## 2025-06-05 NOTE — H&P ADULT - NSHPLABSRESULTS_GEN_ALL_CORE
14.9   11.72 )-----------( 210      ( 05 Jun 2025 20:34 )             41.9       06-05    128[L]  |  93[L]  |  17  ----------------------------<  103[H]  4.1   |  22  |  0.98    Ca    9.3      05 Jun 2025 20:34  Phos  3.1     06-05  Mg     2.2     06-05    TPro  7.4  /  Alb  4.2  /  TBili  0.8  /  DBili  x   /  AST  63[H]  /  ALT  29  /  AlkPhos  61  06-05              Urinalysis Basic - ( 05 Jun 2025 20:34 )    Color: x / Appearance: x / SG: x / pH: x  Gluc: 103 mg/dL / Ketone: x  / Bili: x / Urobili: x   Blood: x / Protein: x / Nitrite: x   Leuk Esterase: x / RBC: x / WBC x   Sq Epi: x / Non Sq Epi: x / Bacteria: x        PT/INR - ( 05 Jun 2025 20:34 )   PT: 12.1 sec;   INR: 1.05 ratio         PTT - ( 05 Jun 2025 20:34 )  PTT:154.8 sec    Lactate Trend            CAPILLARY BLOOD GLUCOSE

## 2025-06-05 NOTE — H&P ADULT - ASSESSMENT
-=    65 year old with PMHx of HTN and CAD (Stentx1 Cx in 4/2024) presented to [facility] with chest pain after power washing his house for the past 4 hours.  He indicated that he fel very hot and sweaty associated with the chest pain, he took ASA 325mg and presented to the ED. EKG was noted with MP, he was also loaded with plavix and heparin prior to transfer to [facility].  s/p LHC, with %, LAURYN x1 LM.  Admitted to CICU for further management.     #Neuro  A+Ox4  -no focal deficits     #Pulm  Saturating >94% on room air  -no active issues     #Cardiac   STEMI  -s/p [facility], with %, LAURYN x1 LM due to thrombus   -c/w DAPT  -unable to tolerated statin due to muscle pain  -pt takes Raptha at home 2x/month, will bring home medication in tomorrow, He's due for dose today  -TTE in the AM   -resumed home hydralazine     #Renal   Normal Scr  -no active issues     #GI  restart PO diet   -no active issues     #Heme   stable H/H  -no active issues     #ID  afebrile  -Will monitor off antibiotics     #Endo  f/u Hgb A1c and TSH on admission

## 2025-06-05 NOTE — ED ADULT TRIAGE NOTE - CHIEF COMPLAINT QUOTE
Pt presents to the ED from home with the complaint of chest pain that started 1 hour prior to arrival. Pt states he was power washing outside all day. Pt denies SOB. Pt states he took 2 chewable aspirins prior to arrival.

## 2025-06-05 NOTE — ED ADULT TRIAGE NOTE - ACCOMPANIED BY
Immediate family member PLEASE CONTACT ISMA HARMON ([facility] EMERGENCY DEPARTMENT CLINICAL REFERRAL COORDINATOR) TO ASSIST IN SCHEDULING YOUR FOLLOW-UP APPOINTMENT WITH PRIMARY CARE DOCTOR AND WITH WOUND CARE SPECIALIST.    Monday - Friday 11am-7pm  [phone]  [email or web address]

## 2025-06-05 NOTE — PATIENT PROFILE ADULT - FALL HARM RISK - HARM RISK INTERVENTIONS
Assistance OOB with selected safe patient handling equipment/Communicate Risk of Fall with Harm to all staff/Monitor for mental status changes/Monitor gait and stability/Reinforce activity limits and safety measures with patient and family/Review medications for side effects contributing to fall risk/Sit up slowly, dangle for a short time, stand at bedside before walking/Tailored Fall Risk Interventions/Visual Cue: Yellow wristband and red socks/Bed in lowest position, wheels locked, appropriate side rails in place/Call bell, personal items and telephone in reach/Instruct patient to call for assistance before getting out of bed or chair/Non-slip footwear when patient is out of bed/[place] to call system/Physically safe environment - no spills, clutter or unnecessary equipment/Purposeful Proactive Rounding/Room/bathroom lighting operational, light cord in reach

## 2025-06-05 NOTE — ED PROVIDER NOTE - CRITICAL CARE ATTENDING CONTRIBUTION TO CARE
65-year-old male came to the emergency room chief complaint of chest pain started shortly prior to arrival patient was power washing at home for 4 hours stated that he felt very hot and sweaty came indoors started having chest pain took his aspirin 325 mg x 2 with some relief patient has a known history of coronary artery disease with stents in the circumflex artery  EKG shows a STEMI on anterolateral inferior leads patient was given Plavix 600 heparin drip was started transferred to Cath Lab  Patient was critically ill with a high probability of imminent or life threatening deterioration.  direct patient care (not related to procedure), additional history taking, interpretation of diagnostic studies, documentation, consultation with other physicians, telephone consultation with the patient's family  I have personally and independently provided the amount of critical care time documented below excluding time spent on separate procedures.  40 mins

## 2025-06-05 NOTE — H&P ADULT - HISTORY OF PRESENT ILLNESS
65 year old with PMHx of HTN and CAD (Stentx1 Cx in 4/2024) presented to [facility] with chest pain after power washing his house for the past 4 hours.  He indicated that he fel very hot and sweaty associated with the chest pain, he took ASA 325mg and presented to the ED. EKG was noted with MP, he was also loaded with plavix and heparin prior to transfer to [facility].  s/p LHC, with %, LAURYN x1 LM.  Admitted to CICU for further management.

## 2025-06-06 ENCOUNTER — RESULT REVIEW (OUTPATIENT)
Age: 65
End: 2025-06-06

## 2025-06-06 LAB
A1C WITH ESTIMATED AVERAGE GLUCOSE RESULT: 5.2 % — SIGNIFICANT CHANGE UP (ref 4–5.6)
ALBUMIN SERPL ELPH-MCNC: 4.2 G/DL — SIGNIFICANT CHANGE UP (ref 3.3–5)
ALP SERPL-CCNC: 54 U/L — SIGNIFICANT CHANGE UP (ref 40–120)
ALT FLD-CCNC: 29 U/L — SIGNIFICANT CHANGE UP (ref 10–45)
ANION GAP SERPL CALC-SCNC: 15 MMOL/L — SIGNIFICANT CHANGE UP (ref 5–17)
APTT BLD: 28.1 SEC — SIGNIFICANT CHANGE UP (ref 26.1–36.8)
AST SERPL-CCNC: 77 U/L — HIGH (ref 10–40)
BASE EXCESS BLDV CALC-SCNC: 1.3 MMOL/L — SIGNIFICANT CHANGE UP (ref -2–3)
BASOPHILS # BLD AUTO: 0.04 K/UL — SIGNIFICANT CHANGE UP (ref 0–0.2)
BASOPHILS NFR BLD AUTO: 0.4 % — SIGNIFICANT CHANGE UP (ref 0–2)
BILIRUB SERPL-MCNC: 0.7 MG/DL — SIGNIFICANT CHANGE UP (ref 0.2–1.2)
BLD GP AB SCN SERPL QL: NEGATIVE — SIGNIFICANT CHANGE UP
BUN SERPL-MCNC: 14 MG/DL — SIGNIFICANT CHANGE UP (ref 7–23)
CALCIUM SERPL-MCNC: 8.8 MG/DL — SIGNIFICANT CHANGE UP (ref 8.4–10.5)
CHLORIDE SERPL-SCNC: 94 MMOL/L — LOW (ref 96–108)
CHOLEST SERPL-MCNC: 180 MG/DL — SIGNIFICANT CHANGE UP
CO2 BLDV-SCNC: 27 MMOL/L — HIGH (ref 22–26)
CO2 SERPL-SCNC: 21 MMOL/L — LOW (ref 22–31)
CREAT SERPL-MCNC: 0.86 MG/DL — SIGNIFICANT CHANGE UP (ref 0.5–1.3)
EGFR: 95 ML/MIN/1.73M2 — SIGNIFICANT CHANGE UP
EGFR: 95 ML/MIN/1.73M2 — SIGNIFICANT CHANGE UP
EOSINOPHIL # BLD AUTO: 0.07 K/UL — SIGNIFICANT CHANGE UP (ref 0–0.5)
EOSINOPHIL NFR BLD AUTO: 0.7 % — SIGNIFICANT CHANGE UP (ref 0–6)
ESTIMATED AVERAGE GLUCOSE: 103 MG/DL — SIGNIFICANT CHANGE UP (ref 68–114)
GLUCOSE SERPL-MCNC: 95 MG/DL — SIGNIFICANT CHANGE UP (ref 70–99)
HCO3 BLDV-SCNC: 26 MMOL/L — SIGNIFICANT CHANGE UP (ref 22–29)
HCT VFR BLD CALC: 39.5 % — SIGNIFICANT CHANGE UP (ref 39–50)
HDLC SERPL-MCNC: 72 MG/DL — SIGNIFICANT CHANGE UP
HGB BLD-MCNC: 14.3 G/DL — SIGNIFICANT CHANGE UP (ref 13–17)
IMM GRANULOCYTES NFR BLD AUTO: 0.5 % — SIGNIFICANT CHANGE UP (ref 0–0.9)
INR BLD: 1.03 RATIO — SIGNIFICANT CHANGE UP (ref 0.85–1.16)
LDLC SERPL-MCNC: 99 MG/DL — SIGNIFICANT CHANGE UP
LIPID PNL WITH DIRECT LDL SERPL: 99 MG/DL — SIGNIFICANT CHANGE UP
LYMPHOCYTES # BLD AUTO: 2.04 K/UL — SIGNIFICANT CHANGE UP (ref 1–3.3)
LYMPHOCYTES # BLD AUTO: 21.5 % — SIGNIFICANT CHANGE UP (ref 13–44)
MAGNESIUM SERPL-MCNC: 2.1 MG/DL — SIGNIFICANT CHANGE UP (ref 1.6–2.6)
MCHC RBC-ENTMCNC: 32.1 PG — SIGNIFICANT CHANGE UP (ref 27–34)
MCHC RBC-ENTMCNC: 36.2 G/DL — HIGH (ref 32–36)
MCV RBC AUTO: 88.8 FL — SIGNIFICANT CHANGE UP (ref 80–100)
MONOCYTES # BLD AUTO: 0.85 K/UL — SIGNIFICANT CHANGE UP (ref 0–0.9)
MONOCYTES NFR BLD AUTO: 8.9 % — SIGNIFICANT CHANGE UP (ref 2–14)
NEUTROPHILS # BLD AUTO: 6.46 K/UL — SIGNIFICANT CHANGE UP (ref 1.8–7.4)
NEUTROPHILS NFR BLD AUTO: 68 % — SIGNIFICANT CHANGE UP (ref 43–77)
NONHDLC SERPL-MCNC: 108 MG/DL — SIGNIFICANT CHANGE UP
NRBC BLD AUTO-RTO: 0 /100 WBCS — SIGNIFICANT CHANGE UP (ref 0–0)
PCO2 BLDV: 40 MMHG — LOW (ref 42–55)
PH BLDV: 7.42 — SIGNIFICANT CHANGE UP (ref 7.32–7.43)
PHOSPHATE SERPL-MCNC: 3.3 MG/DL — SIGNIFICANT CHANGE UP (ref 2.5–4.5)
PLATELET # BLD AUTO: 212 K/UL — SIGNIFICANT CHANGE UP (ref 150–400)
PO2 BLDV: 38 MMHG — SIGNIFICANT CHANGE UP (ref 25–45)
POTASSIUM SERPL-MCNC: 3.3 MMOL/L — LOW (ref 3.5–5.3)
POTASSIUM SERPL-SCNC: 3.3 MMOL/L — LOW (ref 3.5–5.3)
PROT SERPL-MCNC: 6.9 G/DL — SIGNIFICANT CHANGE UP (ref 6–8.3)
PROTHROM AB SERPL-ACNC: 11.8 SEC — SIGNIFICANT CHANGE UP (ref 9.9–13.4)
RBC # BLD: 4.45 M/UL — SIGNIFICANT CHANGE UP (ref 4.2–5.8)
RBC # FLD: 11.7 % — SIGNIFICANT CHANGE UP (ref 10.3–14.5)
RH IG SCN BLD-IMP: POSITIVE — SIGNIFICANT CHANGE UP
SAO2 % BLDV: 79.1 % — SIGNIFICANT CHANGE UP (ref 67–88)
SODIUM SERPL-SCNC: 130 MMOL/L — LOW (ref 135–145)
TRIGL SERPL-MCNC: 44 MG/DL — SIGNIFICANT CHANGE UP
TSH SERPL-MCNC: 0.56 UIU/ML — SIGNIFICANT CHANGE UP (ref 0.27–4.2)
WBC # BLD: 9.51 K/UL — SIGNIFICANT CHANGE UP (ref 3.8–10.5)
WBC # FLD AUTO: 9.51 K/UL — SIGNIFICANT CHANGE UP (ref 3.8–10.5)

## 2025-06-06 PROCEDURE — 93010 ELECTROCARDIOGRAM REPORT: CPT

## 2025-06-06 PROCEDURE — 93306 TTE W/DOPPLER COMPLETE: CPT | Mod: 26

## 2025-06-06 PROCEDURE — 93356 MYOCRD STRAIN IMG SPCKL TRCK: CPT

## 2025-06-06 PROCEDURE — 99233 SBSQ HOSP IP/OBS HIGH 50: CPT

## 2025-06-06 RX ORDER — HEPARIN SODIUM 1000 [USP'U]/ML
5000 INJECTION INTRAVENOUS; SUBCUTANEOUS EVERY 8 HOURS
Refills: 0 | Status: DISCONTINUED | OUTPATIENT
Start: 2025-06-06 | End: 2025-06-08

## 2025-06-06 RX ORDER — POLYETHYLENE GLYCOL 3350 17 G/17G
17 POWDER, FOR SOLUTION ORAL DAILY
Refills: 0 | Status: DISCONTINUED | OUTPATIENT
Start: 2025-06-06 | End: 2025-06-08

## 2025-06-06 RX ORDER — ACETAMINOPHEN 500 MG/5ML
650 LIQUID (ML) ORAL EVERY 6 HOURS
Refills: 0 | Status: DISCONTINUED | OUTPATIENT
Start: 2025-06-06 | End: 2025-06-08

## 2025-06-06 RX ORDER — SENNA 187 MG
2 TABLET ORAL AT BEDTIME
Refills: 0 | Status: DISCONTINUED | OUTPATIENT
Start: 2025-06-06 | End: 2025-06-08

## 2025-06-06 RX ORDER — AMLODIPINE BESYLATE 10 MG/1
10 TABLET ORAL DAILY
Refills: 0 | Status: DISCONTINUED | OUTPATIENT
Start: 2025-06-06 | End: 2025-06-08

## 2025-06-06 RX ORDER — CARVEDILOL 3.12 MG/1
3.12 TABLET, FILM COATED ORAL EVERY 12 HOURS
Refills: 0 | Status: DISCONTINUED | OUTPATIENT
Start: 2025-06-06 | End: 2025-06-08

## 2025-06-06 RX ADMIN — CLOPIDOGREL BISULFATE 75 MILLIGRAM(S): 75 TABLET, FILM COATED ORAL at 12:00

## 2025-06-06 RX ADMIN — Medication 50 MILLIGRAM(S): at 06:17

## 2025-06-06 RX ADMIN — Medication 81 MILLIGRAM(S): at 12:01

## 2025-06-06 RX ADMIN — BUSPIRONE HYDROCHLORIDE 10 MILLIGRAM(S): 15 TABLET ORAL at 19:03

## 2025-06-06 RX ADMIN — AMLODIPINE BESYLATE 10 MILLIGRAM(S): 10 TABLET ORAL at 05:14

## 2025-06-06 RX ADMIN — HEPARIN SODIUM 5000 UNIT(S): 1000 INJECTION INTRAVENOUS; SUBCUTANEOUS at 22:03

## 2025-06-06 RX ADMIN — Medication 40 MILLIEQUIVALENT(S): at 01:38

## 2025-06-06 RX ADMIN — Medication 40 MILLIEQUIVALENT(S): at 05:14

## 2025-06-06 RX ADMIN — METOPROLOL SUCCINATE 12.5 MILLIGRAM(S): 50 TABLET, EXTENDED RELEASE ORAL at 06:16

## 2025-06-06 RX ADMIN — BUSPIRONE HYDROCHLORIDE 10 MILLIGRAM(S): 15 TABLET ORAL at 05:14

## 2025-06-06 RX ADMIN — CARVEDILOL 3.12 MILLIGRAM(S): 3.12 TABLET, FILM COATED ORAL at 17:08

## 2025-06-06 RX ADMIN — Medication 1 APPLICATION(S): at 05:14

## 2025-06-06 RX ADMIN — HEPARIN SODIUM 5000 UNIT(S): 1000 INJECTION INTRAVENOUS; SUBCUTANEOUS at 16:57

## 2025-06-06 RX ADMIN — POLYETHYLENE GLYCOL 3350 17 GRAM(S): 17 POWDER, FOR SOLUTION ORAL at 12:00

## 2025-06-06 NOTE — CHART NOTE - NSCHARTNOTEFT_GEN_A_CORE
CICU Transfer Note  Transfer from: CICU  Transfer to: Telemetry  Accepting Physician: Dr. Oneill  Signout given to: Dr. Oneill and floor ACP    CCU COURSE: 65 year old with PMHx of HTN and CAD (Stentx1 Cx in 4/2024) presented to [facility] with chest pain after power washing his house for the past 4 hours.  He indicated that he fel very hot and sweaty associated with the chest pain, he took ASA 325mg and presented to the ED. EKG was noted with MP, he was also loaded with plavix and heparin prior to transfer to [facility].  s/p LHC, with %, LAURYN x1 LM c/b jailed Circ s/p POBA.  Admitted to CICU for further management. Femoral sheath removed overnight, ambulated at 11am without complaints. Short runs of NSVT since admitted to CICU, started on lopressor 12.5 with improvement.     FOR FOLLOW UP:   [] Discharge in AM  [] Fax hosp course to private cards [phone]  [] Uptitrate PO ALR - discontinued home hydral and started coreg 3.125    PAST MEDICAL & SURGICAL HISTORY:  HTN (hypertension)      Lyme disease      HLD (hyperlipidemia)      Aneurysm, ascending aorta      History of diverticulitis      History of BPH      Myocardial bridge      Hiatal hernia with GERD      CAD S/P percutaneous coronary angioplasty      No significant past surgical history          Vital Signs Last 24 Hrs  T(C): 37 (06 Jun 2025 07:00), Max: 37.2 (05 Jun 2025 18:50)  T(F): 98.6 (06 Jun 2025 07:00), Max: 98.9 (05 Jun 2025 18:50)  HR: 62 (06 Jun 2025 14:00) (52 - 78)  BP: 111/67 (06 Jun 2025 13:00) (104/58 - 164/76)  BP(mean): 878 (06 Jun 2025 13:00) (75 - 878)  RR: 16 (06 Jun 2025 14:00) (10 - 22)  SpO2: 100% (06 Jun 2025 14:00) (92% - 100%)    Parameters below as of 06 Jun 2025 14:00  Patient On (Oxygen Delivery Method): room air      I&O's Summary    05 Jun 2025 07:01  -  06 Jun 2025 07:00  --------------------------------------------------------  IN: 600 mL / OUT: 3125 mL / NET: -2525 mL    06 Jun 2025 07:01  -  06 Jun 2025 15:22  --------------------------------------------------------  IN: 480 mL / OUT: 600 mL / NET: -120 mL      Allergies    statins (Muscle Pain)    Intolerances      MEDICATIONS  (STANDING):  amLODIPine   Tablet 10 milliGRAM(s) Oral daily  aspirin  chewable 81 milliGRAM(s) Oral daily  busPIRone 10 milliGRAM(s) Oral two times a day  chlorhexidine 2% Cloths 1 Application(s) Topical <User Schedule>  clopidogrel Tablet 75 milliGRAM(s) Oral daily  heparin   Injectable 5000 Unit(s) SubCutaneous every 8 hours  hydrALAZINE 50 milliGRAM(s) Oral two times a day  metoprolol tartrate 12.5 milliGRAM(s) Oral every 12 hours  polyethylene glycol 3350 17 Gram(s) Oral daily  senna 2 Tablet(s) Oral at bedtime    MEDICATIONS  (PRN):      Adult Advanced Hemodynamics Last 24 Hrs                            14.3   9.51  )-----------( 212      ( 06 Jun 2025 00:48 )             39.5     06-06    130[L]  |  94[L]  |  14  ----------------------------<  95  3.3[L]   |  21[L]  |  0.86    Ca    8.8      06 Jun 2025 00:48  Phos  3.3     06-06  Mg     2.1     06-06    TPro  6.9  /  Alb  4.2  /  TBili  0.7  /  DBili  x   /  AST  77[H]  /  ALT  29  /  AlkPhos  54  06-06    PT/INR - ( 06 Jun 2025 00:49 )   PT: 11.8 sec;   INR: 1.03 ratio         PTT - ( 06 Jun 2025 00:49 )  PTT:28.1 sec

## 2025-06-06 NOTE — PROGRESS NOTE PEDS - SUBJECTIVE AND OBJECTIVE BOX
EVITASKYAURELIANO CUMMINS  MRN-[id number]  Patient is a 65y old  Male who presents with a chief complaint of Chest Pain (05 Jun 2025 19:15)    HPI:  65 year old with PMHx of HTN and CAD (Stentx1 Cx in 4/2024) presented to [facility] with chest pain after power washing his house for the past 4 hours.  He indicated that he fel very hot and sweaty associated with the chest pain, he took ASA 325mg and presented to the ED. EKG was noted with MP, he was also loaded with plavix and heparin prior to transfer to [facility].  s/p LHC, with %, LAURYN x1 LM.  Admitted to CICU for further management.  (05 Jun 2025 19:15)      24 HOUR EVENTS:  -Admitted overnight iso STEMI  -Femoral sheath removed overnight with no issues    REVIEW OF SYSTEMS:  CONSTITUTIONAL: No weakness, fevers or chills  EYES/ENT: No visual changes;  No vertigo or throat pain   NECK: No pain or stiffness  RESPIRATORY: No cough, wheezing, hemoptysis; No shortness of breath  CARDIOVASCULAR: No chest pain or palpitations  GASTROINTESTINAL: No abdominal or epigastric pain. No nausea, vomiting, or hematemesis; No diarrhea or constipation. No melena or hematochezia.  GENITOURINARY: No dysuria, frequency or hematuria  NEUROLOGICAL: No numbness or weakness  SKIN: No itching, rashes    ICU Vital Signs Last 24 Hrs  T(C): 36.4 (06 Jun 2025 03:00), Max: 37.2 (05 Jun 2025 18:50)  T(F): 97.6 (06 Jun 2025 03:00), Max: 98.9 (05 Jun 2025 18:50)  HR: 53 (06 Jun 2025 06:00) (52 - 78)  BP: 122/72 (06 Jun 2025 06:00) (122/72 - 164/76)  BP(mean): 93 (06 Jun 2025 06:00) (93 - 117)  RR: 10 (06 Jun 2025 06:00) (10 - 20)  SpO2: 96% (06 Jun 2025 06:00) (92% - 100%)    O2 Parameters below as of 06 Jun 2025 07:00  Patient On (Oxygen Delivery Method): room air      I&O's Summary    05 Jun 2025 07:01 - 06 Jun 2025 06:38  --------------------------------------------------------  IN: 600 mL / OUT: 3125 mL / NET: -2525 mL        PHYSICAL EXAM:  GENERAL: No acute distress, well-developed  HEAD:  Atraumatic, Normocephalic  EYES: EOMI, PERRLA, conjunctiva and sclera clear  NECK: Supple, no lymphadenopathy, no JVD  CHEST/LUNG: CTAB; No wheezes, rales, or rhonchi  HEART: Regular rate and rhythm. Normal S1/S2. No murmurs, rubs, or gallops  ABDOMEN: Soft, non-tender, non-distended; normal bowel sounds, no organomegaly  EXTREMITIES:  2+ peripheral pulses b/l, No clubbing, cyanosis, or edema  NEUROLOGY: A&O x 3, no focal deficits  SKIN: No rashes or lesions    ============================I/O===========================   I&O's Detail    05 Jun 2025 07:01 - 06 Jun 2025 06:38  --------------------------------------------------------  IN:    Oral Fluid: 600 mL  Total IN: 600 mL    OUT:    Voided (mL): 3125 mL  Total OUT: 3125 mL    Total NET: -2525 mL        ============================ LABS =========================                        14.3   9.51  )-----------( 212      ( 06 Jun 2025 00:48 )             39.5     06-06    130[L]  |  94[L]  |  14  ----------------------------<  95  3.3[L]   |  21[L]  |  0.86    Ca    8.8      06 Jun 2025 00:48  Phos  3.3     06-06  Mg     2.1     06-06    TPro  6.9  /  Alb  4.2  /  TBili  0.7  /  DBili  x   /  AST  77[H]  /  ALT  29  /  AlkPhos  54  06-06      LIVER FUNCTIONS - ( 06 Jun 2025 00:48 )  Alb: 4.2 g/dL / Pro: 6.9 g/dL / ALK PHOS: 54 U/L / ALT: 29 U/L / AST: 77 U/L / GGT: x           PT/INR - ( 06 Jun 2025 00:49 )   PT: 11.8 sec;   INR: 1.03 ratio         PTT - ( 06 Jun 2025 00:49 )  PTT:28.1 sec      Urinalysis Basic - ( 06 Jun 2025 00:48 )    Color: x / Appearance: x / SG: x / pH: x  Gluc: 95 mg/dL / Ketone: x  / Bili: x / Urobili: x   Blood: x / Protein: x / Nitrite: x   Leuk Esterase: x / RBC: x / WBC x   Sq Epi: x / Non Sq Epi: x / Bacteria: x      ======================Micro/Rad/Cardio=================  Telemetry: Reviewed   EKG: Reviewed  CXR: Reviewed  Culture: Reviewed   Echo:   Cath:

## 2025-06-06 NOTE — PROGRESS NOTE PEDS - ASSESSMENT
65 year old with PMHx of HTN and CAD (Stentx1 Cx in 4/2024) presented to [facility] with chest pain after power washing his house for the past 4 hours.  He indicated that he fel very hot and sweaty associated with the chest pain, he took ASA 325mg and presented to the ED. EKG was noted with MP, he was also loaded with plavix and heparin prior to transfer to [facility].  s/p LHC, with %, LAURYN x1 LM.  Admitted to CICU for further management.     #Neuro  A+Ox4  -no focal deficits     #Pulm  Saturating >94% on room air  -no active issues     #Cardiac   STEMI  -s/p LH, with %, LAURYN x1 LM due to thrombus   -c/w DAPT  -unable to tolerated statin due to muscle pain  -pt takes Raptha at home 2x/month, will bring home medication in tomorrow, He's due for dose today  -TTE in the AM   -resumed home hydralazine     #Renal   Normal Scr  -no active issues     #GI  restart PO diet   -no active issues     #Heme   stable H/H  -no active issues     #ID  afebrile  -Will monitor off antibiotics     #Endo  f/u Hgb A1c and TSH on admission     ======================= DISPOSITION  =====================  [X] Critical   [ ] Guarded    [ ] Stable    [X] Maintain in CICU  [ ] Downgrade to Telemetry  [ ] Discharge Home    Patient requires continuous monitoring with bedside rhythm monitoring, pulse ox monitoring, and intermittent blood gas analysis. Care plan discussed with ICU care team. Patient remained critical and at risk for life threatening decompensation.  Patient seen, examined and plan discussed with CCU team during rounds.     Cecilia Foley PA-C

## 2025-06-06 NOTE — CHART NOTE - NSCHARTNOTEFT_GEN_A_CORE
Removal of Femoral Sheath    Pulses in the right lower extremity are palpable. The patient was placed in the supine position. The insertion site was identified and the sutures were removed per protocol.  The __6__ Jamaican femoral sheath was then removed. Direct pressure was applied for  ___18___ minutes.     Monitoring of the right groin and both lower extremities including neuro-vascular checks and vital signs every 15 minutes x 4, then every 30 minutes x 2, then every 1 hour was ordered.    Complications: None/Other    Comments: Pt tolerated well, no hematoma noted.

## 2025-06-07 ENCOUNTER — TRANSCRIPTION ENCOUNTER (OUTPATIENT)
Age: 65
End: 2025-06-07

## 2025-06-07 DIAGNOSIS — E78.5 HYPERLIPIDEMIA, UNSPECIFIED: ICD-10-CM

## 2025-06-07 DIAGNOSIS — F41.9 ANXIETY DISORDER, UNSPECIFIED: ICD-10-CM

## 2025-06-07 DIAGNOSIS — I21.3 ST ELEVATION (STEMI) MYOCARDIAL INFARCTION OF UNSPECIFIED SITE: ICD-10-CM

## 2025-06-07 DIAGNOSIS — I10 ESSENTIAL (PRIMARY) HYPERTENSION: ICD-10-CM

## 2025-06-07 LAB
ANION GAP SERPL CALC-SCNC: 11 MMOL/L — SIGNIFICANT CHANGE UP (ref 5–17)
ANION GAP SERPL CALC-SCNC: 14 MMOL/L — SIGNIFICANT CHANGE UP (ref 5–17)
BUN SERPL-MCNC: 15 MG/DL — SIGNIFICANT CHANGE UP (ref 7–23)
BUN SERPL-MCNC: 17 MG/DL — SIGNIFICANT CHANGE UP (ref 7–23)
CALCIUM SERPL-MCNC: 9.3 MG/DL — SIGNIFICANT CHANGE UP (ref 8.4–10.5)
CALCIUM SERPL-MCNC: 9.4 MG/DL — SIGNIFICANT CHANGE UP (ref 8.4–10.5)
CHLORIDE SERPL-SCNC: 97 MMOL/L — SIGNIFICANT CHANGE UP (ref 96–108)
CHLORIDE SERPL-SCNC: 99 MMOL/L — SIGNIFICANT CHANGE UP (ref 96–108)
CO2 SERPL-SCNC: 21 MMOL/L — LOW (ref 22–31)
CO2 SERPL-SCNC: 22 MMOL/L — SIGNIFICANT CHANGE UP (ref 22–31)
CREAT SERPL-MCNC: 0.91 MG/DL — SIGNIFICANT CHANGE UP (ref 0.5–1.3)
CREAT SERPL-MCNC: 0.97 MG/DL — SIGNIFICANT CHANGE UP (ref 0.5–1.3)
EGFR: 86 ML/MIN/1.73M2 — SIGNIFICANT CHANGE UP
EGFR: 86 ML/MIN/1.73M2 — SIGNIFICANT CHANGE UP
EGFR: 92 ML/MIN/1.73M2 — SIGNIFICANT CHANGE UP
EGFR: 92 ML/MIN/1.73M2 — SIGNIFICANT CHANGE UP
GLUCOSE SERPL-MCNC: 93 MG/DL — SIGNIFICANT CHANGE UP (ref 70–99)
GLUCOSE SERPL-MCNC: 98 MG/DL — SIGNIFICANT CHANGE UP (ref 70–99)
MAGNESIUM SERPL-MCNC: 2.2 MG/DL — SIGNIFICANT CHANGE UP (ref 1.6–2.6)
PHOSPHATE SERPL-MCNC: 2.7 MG/DL — SIGNIFICANT CHANGE UP (ref 2.5–4.5)
POTASSIUM SERPL-MCNC: 4.4 MMOL/L — SIGNIFICANT CHANGE UP (ref 3.5–5.3)
POTASSIUM SERPL-MCNC: 4.6 MMOL/L — SIGNIFICANT CHANGE UP (ref 3.5–5.3)
POTASSIUM SERPL-SCNC: 4.4 MMOL/L — SIGNIFICANT CHANGE UP (ref 3.5–5.3)
POTASSIUM SERPL-SCNC: 4.6 MMOL/L — SIGNIFICANT CHANGE UP (ref 3.5–5.3)
SODIUM SERPL-SCNC: 132 MMOL/L — LOW (ref 135–145)
SODIUM SERPL-SCNC: 132 MMOL/L — LOW (ref 135–145)

## 2025-06-07 PROCEDURE — 99223 1ST HOSP IP/OBS HIGH 75: CPT | Mod: AI

## 2025-06-07 PROCEDURE — 99232 SBSQ HOSP IP/OBS MODERATE 35: CPT | Mod: GC

## 2025-06-07 RX ORDER — CLOPIDOGREL BISULFATE 75 MG/1
1 TABLET, FILM COATED ORAL
Qty: 90 | Refills: 0
Start: 2025-06-07 | End: 2025-09-04

## 2025-06-07 RX ORDER — CARVEDILOL 3.12 MG/1
1 TABLET, FILM COATED ORAL
Qty: 60 | Refills: 1
Start: 2025-06-07 | End: 2025-08-05

## 2025-06-07 RX ORDER — AMLODIPINE BESYLATE 10 MG/1
1 TABLET ORAL
Qty: 30 | Refills: 1
Start: 2025-06-07 | End: 2025-08-05

## 2025-06-07 RX ADMIN — HEPARIN SODIUM 5000 UNIT(S): 1000 INJECTION INTRAVENOUS; SUBCUTANEOUS at 06:30

## 2025-06-07 RX ADMIN — AMLODIPINE BESYLATE 10 MILLIGRAM(S): 10 TABLET ORAL at 06:28

## 2025-06-07 RX ADMIN — Medication 81 MILLIGRAM(S): at 12:58

## 2025-06-07 RX ADMIN — CLOPIDOGREL BISULFATE 75 MILLIGRAM(S): 75 TABLET, FILM COATED ORAL at 12:58

## 2025-06-07 RX ADMIN — BUSPIRONE HYDROCHLORIDE 10 MILLIGRAM(S): 15 TABLET ORAL at 06:29

## 2025-06-07 RX ADMIN — BUSPIRONE HYDROCHLORIDE 10 MILLIGRAM(S): 15 TABLET ORAL at 17:29

## 2025-06-07 RX ADMIN — HEPARIN SODIUM 5000 UNIT(S): 1000 INJECTION INTRAVENOUS; SUBCUTANEOUS at 21:43

## 2025-06-07 RX ADMIN — CARVEDILOL 3.12 MILLIGRAM(S): 3.12 TABLET, FILM COATED ORAL at 17:29

## 2025-06-07 NOTE — DISCHARGE NOTE PROVIDER - NSDCQMCOGNITION_NEU_ALL_CORE
No difficulties Methotrexate Pregnancy And Lactation Text: This medication is Pregnancy Category X and is known to cause fetal harm. This medication is excreted in breast milk.

## 2025-06-07 NOTE — DISCHARGE NOTE NURSING/CASE MANAGEMENT/SOCIAL WORK - NSDCPEFALRISK_GEN_ALL_CORE
For information on Fall & Injury Prevention, visit: [email or web address] OR  [email or web address] OR  https://www.cdc.gov/steadi/patient.html

## 2025-06-07 NOTE — CONSULT NOTE ADULT - ATTENDING COMMENTS
66 y/o man with CAD with prior PCI in 2024 to the LCx and HTN transferred from OSH with CP and ST elevations on ECG now reportedly s/p PCI/LAURYN to 100% dLM/pLAD c/b jailed LCx s/p POBA.  --Now transferred out of CICU and feels great; denies any chest pain or shortness of breath.  --Runs of NSVT on telemetry noted.  --TTE revealed LV EF 59% with hypokinesis of the apex.  --Await official cath report.  --Continue medical optimization for CAD s/p PCI; started on carvedilol 3.125mg BID; monitor for bradycardia.    --Optimize BP control as above.    --Continue DAPT.    --Monitor on telemetry for further episodes of NSVT.  --Will follow.

## 2025-06-07 NOTE — DISCHARGE NOTE NURSING/CASE MANAGEMENT/SOCIAL WORK - NSDCPEPTCAREGIVEDUMATLIST _GEN_ALL_CORE
NSICU Night Intensivist Note    Historical Review:   69F PMH HLD, osteoporosis presented 3/6 for elective complex Acomm aneurysm embolization via balloon assisted coiling. Patient started on ASA 81 in preparation for the procedure. Patient underwent aneurysm embolization with coiling complicated by aneurysm rupture, controlled with coils and balloon tamponade. NISHANT CT showed SAH b/l frontal lobes and thick R SAH, subsequent stability CTH with worsening ICH and IVH. Intubated in ICU with EVD placement.    12hr EVENTS:  - mild agitation requiring low dose precedex  - pending EVD clamp trial    ----------------------------------------------------------------------------------------------------  PHYSICAL EXAM: ***  General: head elevated in bed, pleasant  HEENT: MMM  Neuro: EVD in place, dressing c/d/i  -Mental status- eyes open, follows commands  -CN- PERRL 3mm, EOMI, tongue midline, face symmetric  -SensoriMotor- L side at least 4/5, R side 5/5 (poor cooperation)    CV: regular rate  Pulm: no accessory muscle use  Abd: soft, nontender, nondistended  Skin: warm, dry     NSICU Night Intensivist Note    Historical Review:   69F PMH HLD, osteoporosis presented 3/6 for elective complex Acomm aneurysm embolization via balloon assisted coiling. Patient started on ASA 81 in preparation for the procedure. Patient underwent aneurysm embolization with coiling complicated by aneurysm rupture, controlled with coils and balloon tamponade. NISHANT CT showed SAH b/l frontal lobes and thick R SAH, subsequent stability CTH with worsening ICH and IVH. Intubated in ICU with EVD placement.    12hr EVENTS:  - mild agitation, started seroquel to wean off precedex  - EVD clamped    ----------------------------------------------------------------------------------------------------  PHYSICAL EXAM:  General: head elevated in bed, pleasant  HEENT: MMM  Neuro: EVD in place, dressing c/d/i  -Mental status- eyes open, follows commands, Ox3 with cantonese  prompting  -CN- PERRL 3mm, EOMI, tongue midline, face symmetric  -SensoriMotor- maintain all extremities antigravity    CV: regular rate and rhythm  Pulm: no accessory muscle use, normal WOB  Abd: soft, nontender, nondistended  Skin: warm, dry    -----------------------------------------------------------------------------------------------------  ICU Vital Signs Last 24 Hrs  T(C): 37.5 (14 Mar 2024 00:00), Max: 38.9 (13 Mar 2024 13:00)  T(F): 99.5 (14 Mar 2024 00:00), Max: 102 (13 Mar 2024 13:00)  HR: 106 (14 Mar 2024 00:00) (83 - 115)  BP: 141/87 (14 Mar 2024 00:00) (93/82 - 157/78)  BP(mean): 102 (14 Mar 2024 00:00) (81 - 111)  ABP: --  ABP(mean): --  RR: 17 (14 Mar 2024 00:00) (14 - 24)  SpO2: 99% (14 Mar 2024 00:00) (96% - 100%)    O2 Parameters below as of 14 Mar 2024 00:00  Patient On (Oxygen Delivery Method): room air            I&O's Summary    12 Mar 2024 07:01  -  13 Mar 2024 07:00  --------------------------------------------------------  IN: 4449.4 mL / OUT: 2509 mL / NET: 1940.4 mL    13 Mar 2024 07:01  -  14 Mar 2024 02:08  --------------------------------------------------------  IN: 1667 mL / OUT: 1750 mL / NET: -83 mL        MEDICATIONS  (STANDING):  albuterol    0.083%. 2.5 milliGRAM(s) Nebulizer once  bromocriptine Capsule 10 milliGRAM(s) Oral three times a day  chlorhexidine 2% Cloths 1 Application(s) Topical daily  dexMEDEtomidine Infusion 0.2 MICROgram(s)/kG/Hr (2.25 mL/Hr) IV Continuous <Continuous>  enoxaparin Injectable 30 milliGRAM(s) SubCutaneous <User Schedule>  niMODipine Oral Solution 30 milliGRAM(s) Oral every 2 hours  nystatin Powder 1 Application(s) Topical two times a day  piperacillin/tazobactam IVPB.. 3.375 Gram(s) IV Intermittent every 8 hours  simvastatin 10 milliGRAM(s) Oral at bedtime  tamsulosin 0.4 milliGRAM(s) Oral at bedtime      LAB RESULTS:               9.6    10.02 )-----------( 319      ( 13 Mar 2024 02:30 )             28.2     03-13    137  |  102  |  13.4  ----------------------------<  147<H>  3.3<L>   |  22.0  |  0.40<L>    Ca    7.8<L>      13 Mar 2024 02:30  Phos  2.9     03-13  Mg     2.5     03-13    -----------------------------------------------------------------------------------------------------------------------------------------------------------------------------------               MI

## 2025-06-07 NOTE — DISCHARGE NOTE PROVIDER - NSDCMRMEDTOKEN_GEN_ALL_CORE_FT
amLODIPine 10 mg oral tablet: 1 tab(s) orally once a day  aspirin 81 mg oral tablet: 1 tab(s) orally once a day  busPIRone 10 mg oral tablet: 1 tab(s) orally 2 times a day  carvedilol 3.125 mg oral tablet: 1 tab(s) orally every 12 hours  clopidogrel 75 mg oral tablet: 1 tab(s) orally once a day  Repatha 140 mg/mL subcutaneous solution: 140 milligram(s) subcutaneously 2 times a month   aspirin 81 mg oral tablet: 1 tab(s) orally once a day  Valentín 10 mg-40 mg oral tablet: 1 tab(s) orally once a day  busPIRone 10 mg oral tablet: 1 tab(s) orally 2 times a day  carvedilol 3.125 mg oral tablet: 1 tab(s) orally every 12 hours  clopidogrel 75 mg oral tablet: 1 tab(s) orally once a day  Repatha 140 mg/mL subcutaneous solution: 140 milligram(s) subcutaneously 2 times a month

## 2025-06-07 NOTE — INPATIENT CERTIFICATION FOR MEDICARE PATIENTS - THE STATUS OF COMORBIDITIES.
HM updated with A1C on 8/19/16 from Care Everywhere. According to chart, patient sees Shanti Moy at [facility] for DM management. KATHERIN faxed to Shanti Moy for A1C.     
2. The status of comorbities. (See ED/admit documents)

## 2025-06-07 NOTE — DISCHARGE NOTE NURSING/CASE MANAGEMENT/SOCIAL WORK - NSSCNAMETXT_GEN_ALL_CORE
A Nurse From [facility] Will Contact You To Schedule A Visit For 1 Day Post Discharge From The Hospital

## 2025-06-07 NOTE — DISCHARGE NOTE NURSING/CASE MANAGEMENT/SOCIAL WORK - PATIENT PORTAL LINK FT
You can access the FollowMyHealth Patient Portal offered by [facility] by registering at the following website: [email or web address] By joining [facility]’s FollowMyHealth portal, you will also be able to view your health information using other applications (apps) compatible with our system.

## 2025-06-07 NOTE — CHART NOTE - NSCHARTNOTEFT_GEN_A_CORE
Discussed with patient and cardiology attending. The official position is to monitor tonight given NSVT while in the CICU. No events on telemetry while here. Patient insistent on leaving. He understands our position. He doesn't think anything will happen and feels fine. He states he will follow up with his cardiologist on Monday. I sent him his Plavix and Coreg which are the two more critical medications. His other blood pressure medications can be adjusted outpatient.    Patient to leave AMA. Discussed with patient and cardiology attending. The official position is to monitor tonight given NSVT while in the CICU. No events on telemetry while here. Patient insistent on leaving. He understands our position. He doesn't think anything will happen and feels fine. He states he will follow up with his cardiologist on Monday. I sent him his Plavix and Coreg which are the two more critical medications. His other blood pressure medications can be adjusted outpatient.    Patient to leave AMA.    ADDENDUM: Discussed with cardiology. Patient electing to stay. Will follow up on discharge tomorrow. His home BP regimen may need to be adjusted. I had already sent him his medications in anticipation for DC today.

## 2025-06-07 NOTE — DISCHARGE NOTE PROVIDER - HOSPITAL COURSE
HPI:  65 year old with PMHx of HTN and CAD (Stentx1 Cx in 4/2024) presented to [facility] with chest pain after power washing his house for the past 4 hours.  He indicated that he fel very hot and sweaty associated with the chest pain, he took ASA 325mg and presented to the ED. EKG was noted with MP, he was also loaded with plavix and heparin prior to transfer to [facility].  s/p LHC, with %, LAURYN x1 LM.  Admitted to CICU for further management.  (05 Jun 2025 19:15)    Hospital Course:  65M with PMHx of HTN, CAD (last stent prior to this admission was to Cx in 4/2024 at [facility]), and anxiety presenting with CP while power washing his house. Patient found to have anterior STEMI. Patient s/p LHC with 100% LAD lesion s/p LAURYN to LM c/b by jailed circ s/p POBA.         Problem/Plan - 1:  ·  Problem: STEMI (ST elevation myocardial infarction).   ·  Plan: - Continue with baby aspirin  - Used to be on Pasugrel, but stopped two months prior. Continue with Plavix given recent stent  - OP follow up with Monica Bradley (Fax: [phone])  - Started on Coreg 3.125 mg BID for NSVT in CICU  - TTE with normal EF and no LV thrombus     Problem/Plan - 2:  ·  Problem: HTN (hypertension).   ·  Plan: - Stop home Hydralazine and Olmesartan-Amlodipine  - Discharge on Amlodipine  - ARB to be revisited as outpatient within the week given soft blood pressure  - OP follow up.     Problem/Plan - 3:  ·  Problem: HLD (hyperlipidemia).   ·  Plan: - Continue with Repatha q2 weeks  - OP follow up.     Problem/Plan - 4:  ·  Problem: Anxiety.   ·  Plan: - Continue with Buspirone.     HPI:  65 year old with PMHx of HTN and CAD (Stentx1 Cx in 4/2024) presented to [facility] with chest pain after power washing his house for the past 4 hours.  He indicated that he fel very hot and sweaty associated with the chest pain, he took ASA 325mg and presented to the ED. EKG was noted with MP, he was also loaded with plavix and heparin prior to transfer to [facility].  s/p LHC, with %, LAURYN x1 LM.  Admitted to CICU for further management.  (05 Jun 2025 19:15)    Hospital Course:  65M with PMHx of HTN, CAD (last stent prior to this admission was to Cx in 4/2024 at [facility]), and anxiety presenting with CP while power washing his house. Patient found to have anterior STEMI. Patient s/p LHC with 100% LAD lesion s/p LAURYN to LM c/b by jailed circ s/p POBA.         Problem/Plan - 1:  ·  Problem: STEMI (ST elevation myocardial infarction).   ·  Plan: - Continue with baby aspirin  - Used to be on Pasugrel, but stopped two months prior. Continue with Plavix given recent stent  - OP follow up with Monica Bradley (Fax: [phone])  - Started on Coreg 3.125 mg BID for NSVT in CICU  - TTE with normal EF and no LV thrombus     Problem/Plan - 2:  ·  Problem: HTN (hypertension).   ·  Plan: - Stop home Hydralazine and Olmesartan-Amlodipine  - Discharge on Amlodipine  - ARB to be revisited as outpatient within the week given soft blood pressure, can likely be resumed  - OP follow up.     Problem/Plan - 3:  ·  Problem: HLD (hyperlipidemia).   ·  Plan: - Continue with Repatha q2 weeks  - OP follow up.     Problem/Plan - 4:  ·  Problem: Anxiety.   ·  Plan: - Continue with Buspirone.     HPI:  65 year old with PMHx of HTN and CAD (Stentx1 Cx in 4/2024) presented to [facility] with chest pain after power washing his house for the past 4 hours.  He indicated that he fel very hot and sweaty associated with the chest pain, he took ASA 325mg and presented to the ED. EKG was noted with MP, he was also loaded with plavix and heparin prior to transfer to [facility].  s/p LHC, with %, LAURYN x1 LM.  Admitted to CICU for further management.  (05 Jun 2025 19:15)    Hospital Course:  65M with PMHx of HTN, CAD (last stent prior to this admission was to Cx in 4/2024 at [facility]), and anxiety presenting with CP while power washing his house. Patient found to have anterior STEMI. Patient s/p LHC with 100% LAD lesion s/p LAURYN to LM c/b by jailed circ s/p POBA.         Problem/Plan - 1:  ·  Problem: STEMI (ST elevation myocardial infarction).   ·  Plan: - Continue with baby aspirin  - Used to be on Pasugrel, but stopped two months prior. Continue with Plavix given recent stent  - OP follow up with Monica Bradley (Fax: [phone])  - Started on Coreg 3.125 mg BID for NSVT in CICU  - TTE with normal EF and no LV thrombus     Problem/Plan - 2:  ·  Problem: HTN (hypertension).   ·  Plan: - Stop home Hydralazine and Olmesartan-Amlodipine  - Discharge on Amlodipine  - ARB to be revisited as outpatient within the week given soft blood pressure, can likely be resumed  - OP follow up.     Problem/Plan - 3:  ·  Problem: HLD (hyperlipidemia).   ·  Plan: - Continue with Repatha q2 weeks  - OP follow up.     Problem/Plan - 4:  ·  Problem: Anxiety.   ·  Plan: - Continue with Buspirone.    Patient seen by cardiology on 6/7 and recommended to monitor overnight. In addition, pending final cath report, though per cardiology no further intervention. Patient elected to leave against medical advice and follow up with his cardiologist closely. Discussed extensively. HPI:  65 year old with PMHx of HTN and CAD (Stentx1 Cx in 4/2024) presented to [facility] with chest pain after power washing his house for the past 4 hours.  He indicated that he fel very hot and sweaty associated with the chest pain, he took ASA 325mg and presented to the ED. EKG was noted with MP, he was also loaded with plavix and heparin prior to transfer to [facility].  s/p LHC, with %, LAURYN x1 LM.  Admitted to CICU for further management.  (05 Jun 2025 19:15)    Hospital Course:  65M with PMHx of HTN, CAD (last stent prior to this admission was to Cx in 4/2024 at [facility]), and anxiety presenting with CP while power washing his house. Patient found to have anterior STEMI. Patient s/p LHC with 100% LAD lesion s/p LAURYN to LM c/b by jailed circ s/p POBA.         Problem/Plan - 1:  ·  Problem: STEMI (ST elevation myocardial infarction).   ·  Plan: - Continue with baby aspirin  - Used to be on Pasugrel, but stopped two months prior. Continue with Plavix given recent stent  - OP follow up with Monica Bradley (Fax: [phone])  - Started on Coreg 3.125 mg BID for NSVT in CICU  - TTE with normal EF and no LV thrombus     Problem/Plan - 2:  ·  Problem: HTN (hypertension).   ·  Plan: - Stop home Hydralazine and Olmesartan-Amlodipine  - Discharge on Amlodipine  - ARB to be revisited as outpatient within the week given soft blood pressure, can likely be resumed  - OP follow up.     Problem/Plan - 3:  ·  Problem: HLD (hyperlipidemia).   ·  Plan: - Continue with Repatha q2 weeks  - OP follow up.     Problem/Plan - 4:  ·  Problem: Anxiety.   ·  Plan: - Continue with Buspirone.

## 2025-06-07 NOTE — DISCHARGE NOTE NURSING/CASE MANAGEMENT/SOCIAL WORK - FINANCIAL ASSISTANCE
[facility] provides services at a reduced cost to those who are determined to be eligible through [facility]’s financial assistance program. Information regarding [facility]’s financial assistance program can be found by going to [email or web address] or by calling [phone].

## 2025-06-07 NOTE — DISCHARGE NOTE PROVIDER - ATTENDING DISCHARGE PHYSICAL EXAMINATION:
T(C): 36.6 (06-07-25 @ 05:45), Max: 37 (06-06-25 @ 15:00)  HR: 57 (06-07-25 @ 05:45) (57 - 66)  BP: 120/77 (06-07-25 @ 05:45) (101/65 - 131/58)  RR: 18 (06-07-25 @ 05:45) (13 - 26)  SpO2: 96% (06-07-25 @ 05:45) (96% - 100%)    GEN: male in NAD, appears comfortable, no diaphoresis  EYES: No scleral injection, EOMI  ENTM: neck supple & symmetric without tracheal deviation, moist membranes, no gross hearing impairment  CV: +S1/S2, no m/r/g, no abdominal bruit, no LE edema  RESP: breathing comfortably, no respiratory accessory muscle use, CTAB, no w/r/r  GI: normoactive BS, soft, NTND, no rebounding/guarding, no palpable masses  LYMPHATICS: no LAD or tenderness to palpation  NEURO: AOx3, no focal deficits, CNII-XII grossly intact  PSYCH: No SI/HI/AVH, appropriate affect, appropriate insight/judgment   SKIN: no petechiae, ecchymosis or maculopapular rash noted

## 2025-06-07 NOTE — CONSULT NOTE ADULT - SUBJECTIVE AND OBJECTIVE BOX
Cardiology Progress Note  ------------------------------------------------------------------------------------------    SUBJECTIVE/EVENTS:  - Pt downgraded from the [facility] y/d  - Y/d was noted to have a few episodes of PVCs and NSVT so started on carvedilol 3.125mg BID  - NAEO  - Tele: NSR, 7b run of NSVT around 6pm on , otherwise 2 episodes of PVCs around 8pm and 11pm /d    -------------------------------------------------------------------------------------------  ROS:  All review of systems is negative unless indicated above.   -------------------------------------------------------------------------------------------  VITALS:  T(F): 97.9 (), Max: 98.6 ()  HR: 57 () (57 - 66)  BP: 120/77 () (101/65 - 121/78)  RR: 18 ()  SpO2: 96% ()  I&O's Summary    2025 07:01  -  2025 07:00  --------------------------------------------------------  IN: 480 mL / OUT: 600 mL / NET: -120 mL      ------------------------------------------------------------------------------------------  PHYSICAL EXAM:  GEN: NAD, sitting in bed  HEENT: NCAT, EOMI  CV: RRR, Ns1/s2, no m/r/g, JVP not elevated  RESP: CTA B/l, no w/r/r  ABD: soft, nt, nd  EXT: warm, 2+ pulses, no edema  SKIN: no visible lesions, rashes  NEURO: AAOx3, no focal deficits  PSYCH: Calm, cooperative  -------------------------------------------------------------------------------------------  LABS:                          14.3   9.51  )-----------( 212      ( 2025 00:48 )             39.5     06-07    132[L]  |  99  |  15  ----------------------------<  98  4.6   |  22  |  0.91    Ca    9.3      2025 06:17  Phos  2.7     06-07  Mg     2.2     06-07    TPro  6.9  /  Alb  4.2  /  TBili  0.7  /  DBili  x   /  AST  77[H]  /  ALT  29  /  AlkPhos  54  06-06    PT/INR - ( 2025 00:49 )   PT: 11.8 sec;   INR: 1.03 ratio         PTT - ( 2025 00:49 )  PTT:28.1 sec      Total Cholesterol: 180  LDL: --  HDL: 72  T        -------------------------------------------------------------------------------------------  Meds:  acetaminophen     Tablet .. 650 milliGRAM(s) Oral every 6 hours PRN  amLODIPine   Tablet 10 milliGRAM(s) Oral daily  aspirin  chewable 81 milliGRAM(s) Oral daily  busPIRone 10 milliGRAM(s) Oral two times a day  carvedilol 3.125 milliGRAM(s) Oral every 12 hours  clopidogrel Tablet 75 milliGRAM(s) Oral daily  heparin   Injectable 5000 Unit(s) SubCutaneous every 8 hours  polyethylene glycol 3350 17 Gram(s) Oral daily  senna 2 Tablet(s) Oral at bedtime    -------------------------------------------------------------------------------------------  Cardiovascular Diagnostic Testing:      -------------------------------------------------------------------------------------------  Assessment and Plan:   Mr. Dumont is a 66yo man w/ hx of CAD w/ prior PCI in 2024 to the LCx and HTN who was transferred from an OSH after p/w chest pain found to have STEMI and taken to the cath lab where he was found to have 100% dLM/pLAD s/p LAURYN c/b jailed LCx s/p POBA and admitted to the CICU for monitoring.    #STEMI  #CAD  #HTN  Pt s/p PCI w/ good result. TTE done showed EF 59^ w/ hypokinesis of the apex, c/f possible distal embolization of thrombus during PCI to the LAD. Otherwies grade 1 diastolic dysfx, mild RV enlargement w/ normal RV fx and no valvular disease. The pt had brief episode of NSVT on  prompting initiation of BB, but was asx and HDS.     Recommendations:  - Continue carvedilol 3.125mg BID, limited further up-titration iso mild bradycardia  - Resume home amlodipine 10-olmesartan 40mg qd on DC  - Hold home hydralazine on DC  - Continue ASA 81mg qd, clopidogrel 75mg qd  - Pt intolerant to statins, on Evolovumab as outpt, continue w/ injections on DC  - Can consider ezetimibe 10mg qd for additional LDL lowering as outpt  - tele    *** Recommendations are preliminary until cosigned by the attending.    Severo Song MD  Cardiology Fellow    For all New Consults and Questions:  [email or web address]   Login: [facility] Progress Note  ------------------------------------------------------------------------------------------    SUBJECTIVE/EVENTS:  - Pt downgraded from the [facility] y/d  - Y/d was noted to have a few episodes of PVCs and NSVT so started on carvedilol 3.125mg BID  - NAEO  - Tele: NSR, 7b run of NSVT around 6pm on , otherwise 2 episodes of PVCs around 8pm and 11pm /d    -------------------------------------------------------------------------------------------  ROS:  All review of systems is negative unless indicated above.   -------------------------------------------------------------------------------------------  VITALS:  T(F): 97.9 (), Max: 98.6 ()  HR: 57 () (57 - 66)  BP: 120/77 () (101/65 - 121/78)  RR: 18 ()  SpO2: 96% ()  I&O's Summary    2025 07:01  -  2025 07:00  --------------------------------------------------------  IN: 480 mL / OUT: 600 mL / NET: -120 mL      ------------------------------------------------------------------------------------------  PHYSICAL EXAM:  GEN: NAD, sitting in bed  HEENT: NCAT, EOMI  CV: RRR, Ns1/s2, no m/r/g, JVP not elevated  RESP: CTA B/l, no w/r/r  ABD: soft, nt, nd  EXT: warm, 2+ pulses, no edema  SKIN: no visible lesions, rashes  NEURO: AAOx3, no focal deficits  PSYCH: Calm, cooperative  -------------------------------------------------------------------------------------------  LABS:                          14.3   9.51  )-----------( 212      ( 2025 00:48 )             39.5     06-07    132[L]  |  99  |  15  ----------------------------<  98  4.6   |  22  |  0.91    Ca    9.3      2025 06:17  Phos  2.7     06-07  Mg     2.2     06-07    TPro  6.9  /  Alb  4.2  /  TBili  0.7  /  DBili  x   /  AST  77[H]  /  ALT  29  /  AlkPhos  54  06-06    PT/INR - ( 2025 00:49 )   PT: 11.8 sec;   INR: 1.03 ratio         PTT - ( 2025 00:49 )  PTT:28.1 sec      Total Cholesterol: 180  LDL: --  HDL: 72  T        -------------------------------------------------------------------------------------------  Meds:  acetaminophen     Tablet .. 650 milliGRAM(s) Oral every 6 hours PRN  amLODIPine   Tablet 10 milliGRAM(s) Oral daily  aspirin  chewable 81 milliGRAM(s) Oral daily  busPIRone 10 milliGRAM(s) Oral two times a day  carvedilol 3.125 milliGRAM(s) Oral every 12 hours  clopidogrel Tablet 75 milliGRAM(s) Oral daily  heparin   Injectable 5000 Unit(s) SubCutaneous every 8 hours  polyethylene glycol 3350 17 Gram(s) Oral daily  senna 2 Tablet(s) Oral at bedtime    -------------------------------------------------------------------------------------------  Cardiovascular Diagnostic Testing:      -------------------------------------------------------------------------------------------  Assessment and Plan:   Mr. Dumont is a 66yo man w/ hx of CAD w/ prior PCI in 2024 to the LCx and HTN who was transferred from an OSH after p/w chest pain found to have STEMI and taken to the cath lab where he was found to have 100% dLM/pLAD s/p LAURYN c/b jailed LCx s/p POBA and admitted to the CICU for monitoring.    #STEMI  #CAD  #HTN  Pt s/p PCI w/ good result. TTE done showed EF 59^ w/ hypokinesis of the apex, c/f possible distal embolization of thrombus during PCI to the LAD. Otherwies grade 1 diastolic dysfx, mild RV enlargement w/ normal RV fx and no valvular disease. The pt had brief episode of NSVT on  prompting initiation of BB, but was asx and HDS.     Recommendation:  - Continue carvedilol 3.125mg BID, limited further up-titration iso mild bradycardia  - Resume home amlodipine 10-olmesartan 40mg qd on DC  - Hold home hydralazine on DC  - Continue ASA 81mg qd, clopidogrel 75mg qd  - Pt intolerant to statins, on Evolovumab as outpt, continue w/ injections on DC  - Can consider ezetimibe 10mg qd for additional LDL lowering as outpt  - tele    *** Recommendations are preliminary until cosigned by the attending.    Severo Song MD  Cardiology Fellow    For all New Consults and Questions:  [email or web address]   Login: [employer]

## 2025-06-07 NOTE — DISCHARGE NOTE PROVIDER - NSDCQMERRANDS_GEN_ALL_CORE
Patient:   ANAMARIA SOLIS            MRN: [id number]            FIN: [id number]               Age:   14 years     Sex:  Female     :  2005   Associated Diagnoses:   None   Author:   Norma De León      Basic Information   Time seen: Date & time 4/10/2020 19:10:00.   History source: Patient.   Arrival mode: Walking.   History limitation: None.      History of Present Illness   14-year-old female with a history of asthma presents to ED complaining of vaginal bleeding and pelvic pain for 2 days.  Patient had a positive pregnancy test 1 month ago.  LMP 2020.  .  About 1 month ago, patient had mild vaginal spotting.  Spotting resolved after a few days.  Today, patient is passing nickel sized clots.  Using 1 pad every 3-4 hours. No dysuria, urinary frequency, urinary urgency, urinary hesitancy, hematuria, flank pain,  or F/C/S.  Patient has been taking Tylenol with moderate relief..        Review of Systems   Constitutional symptoms:  No fever,    Skin symptoms:  No rash,    Eye symptoms:  No recent vision problems,    ENMT symptoms:  No sore throat,    Respiratory symptoms:  No shortness of breath,    Cardiovascular symptoms:  No chest pain,    Gastrointestinal symptoms:  Abdominal pain, no nausea, no vomiting.    Genitourinary symptoms:  Vaginal bleeding, no dysuria, no hematuria.    Musculoskeletal symptoms:  No Joint pain,    Neurologic symptoms:  No headache,       Health Status   Allergies:    Allergic Reactions (Selected)  No Known Medication Allergies.   Immunizations: Up to date.   Menstrual history: Per nurse's notes.      Past Medical/ Family/ Social History      Medical history   Respiratory: asthma.   Social history: Alcohol use: Denies, Tobacco use: Denies, Drug use: Denies.      Physical Examination               Vital Signs   Vital Signs   4/10/2020 19:03          Temperature Tympanic      98.9 F                             Respiratory Rate          20 br/min                              Systolic Blood Pressure   113 mmHg                             Diastolic Blood Pressure  81 mmHg                             Mean Arterial Pressure    92 mmHg                             Oxygen Saturation         100 %  .               Per nurse's notes.   Vital Signs were reviewed.   Pulse Ox > 95% on Room Air which is normal for this patient.   General:  Alert, no acute distress.    Skin:  Warm, dry.    Head:  Normocephalic.   Neck:  Supple, trachea midline.    Eye:  Extraocular movements are intact, normal conjunctiva.    Cardiovascular:  Regular rate and rhythm, S1, S2.    Respiratory:  Respirations are non-labored, Symmetrical chest wall expansion.    Gastrointestinal:  Soft, Nontender, Non distended, Normal bowel sounds.    Genitourinary:  Approximately 1 cc of blood noted in vaginal vault.  Cervix closed., External genitalia: Normal, Speculum exam: Bleeding, no discharge, Bimanual exam: No Uterine Tenderness  No adnexal masses or tenderness, no cervix motion tenderness.    Musculoskeletal:  Normal ROM, no swelling.    Neurological:  Alert and oriented to person, place, time, and situation, No focal neurological deficit observed.       Medical Decision Making   Differential Diagnosis:  Vaginal bleeding, pelvic pain, ectopic pregnancy, threatened , spontaneous , urinary tract infection.    Rationale:  14-year-old female presents to ED complaining of vaginal bleeding and pelvic pain.  HPI and physical exam as documented above.  In the ED patient appeared afebrile, nontoxic, and in no acute distress. Physical exam was significant for 1 cc of blood in vaginal vault.  No CMT.  Cervix closed.  Urine hCG was negative.  CBC and CMP within normal limits.  UA with 3+ blood; likely due to vaginal bleeding.  hCG less than 1.  Patient is AB+.  Wet prep within normal limits.  Gonorrhea and chlamydia pending.  Pelvic ultrasound was negative for IUP.  Patient was given ibuprofen 400 mg for pain.   Patient was diagnosed with spontaneous  and discharged home in improved condition.  Supportive care discussed with patient in detail.  Patient advised to follow-up with primary care doctor in 1 to 2 days.  Return to ED if new  or worsening symptoms..   Results review:  Lab results : Lab View   4/10/2020 19:30          Glucose Lvl               86 mg/dL                             BUN                       10 mg/dL                             Creatinine                0.87 mg/dL                             eGFR NonAfrAmer           N/A, <18 yrs mL/min/1.73m2                             Sodium                    141 mEq/L                             Potassium                 3.4 mEq/L  LOW                             Chloride                  105 mEq/L                             TCO2                      25 mEq/L                             AGAP                      14 mEq/L                             Calcium                   10.3 mg/dL  HI                             HCG Quant                 <1 mIU/mL  NA                             WBC                       5.6 K/cumm                             RBC                       4.53 M/cumm                             Hgb                       15.0 g/dL                             Hct                       44 %                             MCV                       96 FL                             MCH                       33 pg                             MCHC                      34 g/dL                             RDW                       11.6 %                             Platelet                  291 K/cumm                             NRBC                      0.0 %                             Abs Neutro                3.6 K/cumm                             Neutrophil                64 %  NA                             Abs Lymph                 1.5 K/cumm                             Lymphocyte                27 %  NA                             Abs Mono                   0.3 K/cumm                             Monocyte                  6 %  NA                             Immature Gran             0.2 %                             Eosinophil                1 %                             Basophil                  1 %                             ABORh Interp              AB POS    4/10/2020 19:23          WBC, wet prep             Few                             Trichomonas               Absent                             Yeast, wet prep           None Seen                             Clue Cells                Absent    4/10/2020 19:14          Urine Preg POC            Negative    4/10/2020 19:06          UA Appear                 Clear                             UA Color                  Yellow                             UA pH                     5.5                             UA Spec Grav              >=1.030                             UA Glucose                Negative                             UA Bili                   Negative                             UA Ketones                Negative                             UA Blood                  3+                             UA Protein                Negative                             UA Urobilinogen           0.2 mg/dL                             UA Nitrite                Negative                             UA Leuk Est               Negative                             UA Epithelial             1 - 5/HPF                             UA RBC                    3 - 10/HPF                             UA WBC                    Rare                             UA Bacteria               Few                             UA Mucous                 Few  .      Reexamination/ Reevaluation   Patient is resting comfortably in bed.  Patient is in no acute distress.      Impression and Plan   Diagnosis   Spontaneous    Plan   Condition: Stable.    Disposition: Medically cleared, Discharged: to home.    Patient was  given the following educational materials: Miscarriage.    Follow up with: Follow up with primary care provider Advised rest, increased fluids, and Tylenol/Advil as needed for pain  Schedule a follow-up appointment with your primary care physician in 2 to 3 days  Return to the emergency department if you develop new or worsening symptoms .    Counseled: Patient, Family, Regarding diagnosis, Regarding diagnostic results, Regarding treatment plan, Patient indicated understanding of instructions.             Electronically Signed On 04.10.2020 22:11  ___________________________________________________   Norma De León     No

## 2025-06-07 NOTE — DISCHARGE NOTE PROVIDER - NSDCCPCAREPLAN_GEN_ALL_CORE_FT
PRINCIPAL DISCHARGE DIAGNOSIS  Diagnosis: STEMI (ST elevation myocardial infarction)  Assessment and Plan of Treatment: Please follow up with your cardiologist in about one week to review our hospital course and make adjustments as needed. If you get recurrent chest pain please seek medical attention.      SECONDARY DISCHARGE DIAGNOSES  Diagnosis: HLD (hyperlipidemia)  Assessment and Plan of Treatment: Continue with home medications    Diagnosis: Anxiety  Assessment and Plan of Treatment: Continue with home medications    Diagnosis: HTN (hypertension)  Assessment and Plan of Treatment: We made adjustments to your home medications. Please follow our instructions and follow up with your cardiologist.

## 2025-06-07 NOTE — PROGRESS NOTE ADULT - NSPROGADDITIONALINFOA_GEN_ALL_CORE
Likely discharge today. Discussed with cardiology yesterday and no further intervention.    Admit & discharge.    >8 hours of management time on my service    85 minutes spent
64yo M with chest pain found to have Inf/Lat STEMI with PCI and later with thrombus in LM, tolerated procedure well. Many allergies to medications     Neuro no issues   Pulm on RA   CV on repatha (due today), LDL still above goal (99) should add zetia outpt. EF preserved.  Cont home ACEi/norvasc and change metop to coreg (some ectopy post revasc). Stop hydral. Talk to outpt cardiology   Renal Cr now wnl   GI DASH   ID no e/o infection   Heme hsq ppx   Endo BG controlled   Lines no central access

## 2025-06-07 NOTE — DISCHARGE NOTE PROVIDER - NSDCFUADDAPPT_GEN_ALL_CORE_FT
APPTS ARE READY TO BE MADE: [x] YES    Best Family or Patient Contact (if needed):    Additional Information about above appointments (if needed):    1:   2:   3:     Other comments or requests:    APPTS ARE READY TO BE MADE: [x] YES    Best Family or Patient Contact (if needed):    Additional Information about above appointments (if needed):    1:   2:   3:     Other comments or requests:       Patient informed us they already have secured a follow up appointment which is not visible on [facility] and declined to provide appointment details.

## 2025-06-08 VITALS
RESPIRATION RATE: 17 BRPM | OXYGEN SATURATION: 96 % | TEMPERATURE: 98 F | HEART RATE: 63 BPM | DIASTOLIC BLOOD PRESSURE: 79 MMHG | SYSTOLIC BLOOD PRESSURE: 127 MMHG

## 2025-06-08 PROCEDURE — 80053 COMPREHEN METABOLIC PANEL: CPT

## 2025-06-08 PROCEDURE — C1769: CPT

## 2025-06-08 PROCEDURE — C1725: CPT

## 2025-06-08 PROCEDURE — C1894: CPT

## 2025-06-08 PROCEDURE — 80048 BASIC METABOLIC PNL TOTAL CA: CPT

## 2025-06-08 PROCEDURE — 86850 RBC ANTIBODY SCREEN: CPT

## 2025-06-08 PROCEDURE — 36415 COLL VENOUS BLD VENIPUNCTURE: CPT

## 2025-06-08 PROCEDURE — 99239 HOSP IP/OBS DSCHRG MGMT >30: CPT

## 2025-06-08 PROCEDURE — C1753: CPT

## 2025-06-08 PROCEDURE — 83735 ASSAY OF MAGNESIUM: CPT

## 2025-06-08 PROCEDURE — 85520 HEPARIN ASSAY: CPT

## 2025-06-08 PROCEDURE — 92941 PRQ TRLML REVSC TOT OCCL AMI: CPT | Mod: LC

## 2025-06-08 PROCEDURE — 86900 BLOOD TYPING SEROLOGIC ABO: CPT

## 2025-06-08 PROCEDURE — 84100 ASSAY OF PHOSPHORUS: CPT

## 2025-06-08 PROCEDURE — 83036 HEMOGLOBIN GLYCOSYLATED A1C: CPT

## 2025-06-08 PROCEDURE — 93356 MYOCRD STRAIN IMG SPCKL TRCK: CPT

## 2025-06-08 PROCEDURE — 85610 PROTHROMBIN TIME: CPT

## 2025-06-08 PROCEDURE — 85730 THROMBOPLASTIN TIME PARTIAL: CPT

## 2025-06-08 PROCEDURE — C1874: CPT

## 2025-06-08 PROCEDURE — C8929: CPT

## 2025-06-08 PROCEDURE — 85025 COMPLETE CBC W/AUTO DIFF WBC: CPT

## 2025-06-08 PROCEDURE — 82803 BLOOD GASES ANY COMBINATION: CPT

## 2025-06-08 PROCEDURE — C9600: CPT | Mod: LM

## 2025-06-08 PROCEDURE — C1887: CPT

## 2025-06-08 PROCEDURE — 86901 BLOOD TYPING SEROLOGIC RH(D): CPT

## 2025-06-08 PROCEDURE — 93458 L HRT ARTERY/VENTRICLE ANGIO: CPT | Mod: 59

## 2025-06-08 PROCEDURE — 80061 LIPID PANEL: CPT

## 2025-06-08 PROCEDURE — 93005 ELECTROCARDIOGRAM TRACING: CPT

## 2025-06-08 PROCEDURE — 84443 ASSAY THYROID STIM HORMONE: CPT

## 2025-06-08 RX ORDER — AMLODIPINE AND OLMESARTAN MEDOXOMIL 5; 40 MG/1; MG/1
1 TABLET ORAL
Qty: 0 | Refills: 0 | DISCHARGE

## 2025-06-08 RX ADMIN — Medication 2 TABLET(S): at 05:14

## 2025-06-08 RX ADMIN — Medication 81 MILLIGRAM(S): at 11:04

## 2025-06-08 RX ADMIN — AMLODIPINE BESYLATE 10 MILLIGRAM(S): 10 TABLET ORAL at 05:14

## 2025-06-08 RX ADMIN — BUSPIRONE HYDROCHLORIDE 10 MILLIGRAM(S): 15 TABLET ORAL at 05:14

## 2025-06-08 RX ADMIN — HEPARIN SODIUM 5000 UNIT(S): 1000 INJECTION INTRAVENOUS; SUBCUTANEOUS at 05:14

## 2025-06-08 RX ADMIN — CARVEDILOL 3.12 MILLIGRAM(S): 3.12 TABLET, FILM COATED ORAL at 05:14

## 2025-06-08 RX ADMIN — CLOPIDOGREL BISULFATE 75 MILLIGRAM(S): 75 TABLET, FILM COATED ORAL at 11:04

## 2025-06-08 NOTE — PROGRESS NOTE ADULT - TIME BILLING
- Ordering, reviewing, and interpreting labs, testing, and imaging.  - Independently obtaining a review of systems and performing a physical exam  - Reviewing consultant documentation/recommendations in addition to discussing plan of care with consultants.  - Counselling and educating patient and family regarding interpretation of aforementioned items and plan of care.
pt counseling lab and imaging interpretation med mgmt care coordination

## 2025-06-08 NOTE — PROGRESS NOTE ADULT - ASSESSMENT
64 yo man with history of CAD w/ prior PCI in 4/2024 to the LCx and HTN who was transferred from an OSH after p/w chest pain found to have STEMI and taken to the cath lab where he was found to have 100% dLM/pLAD s/p LAURYN c/b jailed LCx s/p POBA and admitted to the CICU for monitoring.    #STEMI  #CAD  #HTN  Pt s/p PCI w/ good result. TTE done showed EF 59^ w/ hypokinesis of the apex, c/f possible distal embolization of thrombus during PCI to the LAD. Otherwies grade 1 diastolic dysfx, mild RV enlargement w/ normal RV fx and no valvular disease. The pt had brief episode of NSVT on 6/6 prompting initiation of BB, but was asx and HDS.     Recommendation:  -continue carvedilol 3.125mg BID, limited further up-titration iso mild bradycardia  -resume home amlodipine 10-olmesartan 40mg qd on DC  -hold home hydralazine on DC  -continue ASA 81mg qd, clopidogrel 75mg qd  -pt intolerant to statins, on Evolovumab as outpt, continue w/ injections on DC  -can consider ezetimibe 10mg qd for additional LDL lowering as outpt  -follow up with outpatient cardiologist in the next week     Cleared for discharge from Cardiology perspective.     Please see attending attestation for final recommendations.     Pawel Patterson MD  Cardiology Fellow   
65 year old with PMHx of HTN and CAD (Stentx1 Cx in 4/2024) presented to [facility] with chest pain after power washing his house for the past 4 hours.  He indicated that he fel very hot and sweaty associated with the chest pain, he took ASA 325mg and presented to the ED. EKG was noted with MP, he was also loaded with plavix and heparin prior to transfer to [facility].  s/p LHC, with %, LAURYN x1 LM.  Admitted to CICU for further management.     #Neuro  A+Ox4  -no focal deficits     #Pulm  Saturating >94% on room air  -no active issues     #Cardiac   STEMI  -s/p [facility], with %, LAURYN x1 LM due to thrombus   -c/w DAPT  -unable to tolerated statin due to muscle pain  -pt takes Raptha at home 2x/month, will bring home medication in tomorrow, He's due for dose today  -TTE in the AM   -resumed home hydralazine     #Renal   Normal Scr  -no active issues     #GI  restart PO diet   -no active issues     #Heme   stable H/H  -no active issues     #ID  afebrile  -Will monitor off antibiotics     #Endo  f/u Hgb A1c and TSH on admission     ======================= DISPOSITION  =====================  [ ] Critical   [ ] Guarded    [ ] Stable    [ ] Maintain in CICU  [ ] Downgrade to Telemetry  [ ] Discharge Home    Patient requires continuous monitoring with bedside rhythm monitoring, pulse ox monitoring, and intermittent blood gas analysis. Care plan discussed with ICU care team. Patient remained critical and at risk for life threatening decompensation.  Patient seen, examined and plan discussed with CCU team during rounds.     Cecilia Foley PA-C
65M with PMHx of HTN, CAD (last stent prior to this admission was to Cx in 4/2024 at [facility]), and anxiety presenting with CP while power washing his house. Patient found to have anterior STEMI. Patient s/p LHC with 100% LAD lesion s/p LAURYN to LM c/b by jailed circ s/p POBA.
65M with PMHx of HTN, CAD (last stent prior to this admission was to Cx in 4/2024 at [facility]), and anxiety presenting with CP while power washing his house. Patient found to have anterior STEMI. Patient s/p LHC with 100% LAD lesion s/p LAURYN to LM c/b by jailed circ s/p POBA.

## 2025-06-08 NOTE — PROGRESS NOTE ADULT - PROBLEM SELECTOR PLAN 1
- Continue with baby aspirin  - Used to be on Pasugrel, but stopped two months prior. Continue with Plavix given recent stent  - OP follow up with Monica Bradley (Fax: [phone])  - Started on Coreg 3.125 mg BID for NSVT in CICU
- Continue with baby aspirin  - Used to be on Pasugrel, but stopped two months prior. Continue with Plavix given recent stent  - OP follow up with Monica Bradley (Fax: [phone])  - Started on Coreg 3.125 mg BID for NSVT in CICU

## 2025-06-08 NOTE — PROGRESS NOTE ADULT - SUBJECTIVE AND OBJECTIVE BOX
EVITASKYAURELIANO CUMMINS  MRN-[id number]  Patient is a 65y old  Male who presents with a chief complaint of Chest Pain (05 Jun 2025 19:15)    HPI:  65 year old with PMHx of HTN and CAD (Stentx1 Cx in 4/2024) presented to [facility] with chest pain after power washing his house for the past 4 hours.  He indicated that he fel very hot and sweaty associated with the chest pain, he took ASA 325mg and presented to the ED. EKG was noted with MP, he was also loaded with plavix and heparin prior to transfer to [facility].  s/p LHC, with %, LAURYN x1 LM.  Admitted to CICU for further management.  (05 Jun 2025 19:15)      24 HOUR EVENTS:  - Admitted overnight iso stemi  - Femoral sheath removed without complications    REVIEW OF SYSTEMS:  CONSTITUTIONAL: No weakness, fevers or chills  EYES/ENT: No visual changes;  No vertigo or throat pain   NECK: No pain or stiffness  RESPIRATORY: No cough, wheezing, hemoptysis; No shortness of breath  CARDIOVASCULAR: No chest pain or palpitations  GASTROINTESTINAL: No abdominal or epigastric pain. No nausea, vomiting, or hematemesis; No diarrhea or constipation. No melena or hematochezia.  GENITOURINARY: No dysuria, frequency or hematuria  NEUROLOGICAL: No numbness or weakness  SKIN: No itching, rashes    ICU Vital Signs Last 24 Hrs  T(C): 36.4 (06 Jun 2025 03:00), Max: 37.2 (05 Jun 2025 18:50)  T(F): 97.6 (06 Jun 2025 03:00), Max: 98.9 (05 Jun 2025 18:50)  HR: 53 (06 Jun 2025 06:00) (52 - 78)  BP: 122/72 (06 Jun 2025 06:00) (122/72 - 164/76)  BP(mean): 93 (06 Jun 2025 06:00) (93 - 117)  RR: 10 (06 Jun 2025 06:00) (10 - 20)  SpO2: 96% (06 Jun 2025 06:00) (92% - 100%)    O2 Parameters below as of 06 Jun 2025 07:00  Patient On (Oxygen Delivery Method): room air      I&O's Summary    05 Jun 2025 07:01  -  06 Jun 2025 06:42  --------------------------------------------------------  IN: 600 mL / OUT: 3125 mL / NET: -2525 mL    PHYSICAL EXAM:  GENERAL: No acute distress, well-developed  HEAD:  Atraumatic, Normocephalic  EYES: EOMI, PERRLA, conjunctiva and sclera clear  NECK: Supple, no lymphadenopathy, no JVD  CHEST/LUNG: CTAB; No wheezes, rales, or rhonchi  HEART: Regular rate and rhythm. Normal S1/S2. No murmurs, rubs, or gallops  ABDOMEN: Soft, non-tender, non-distended; normal bowel sounds, no organomegaly  EXTREMITIES:  2+ peripheral pulses b/l, No clubbing, cyanosis, or edema  NEUROLOGY: A&O x 3, no focal deficits  SKIN: No rashes or lesions    ============================I/O===========================   I&O's Detail    05 Jun 2025 07:01  -  06 Jun 2025 06:42  --------------------------------------------------------  IN:    Oral Fluid: 600 mL  Total IN: 600 mL    OUT:    Voided (mL): 3125 mL  Total OUT: 3125 mL    Total NET: -2525 mL    ============================ LABS =========================                        14.3   9.51  )-----------( 212      ( 06 Jun 2025 00:48 )             39.5     06-06    130[L]  |  94[L]  |  14  ----------------------------<  95  3.3[L]   |  21[L]  |  0.86    Ca    8.8      06 Jun 2025 00:48  Phos  3.3     06-06  Mg     2.1     06-06    TPro  6.9  /  Alb  4.2  /  TBili  0.7  /  DBili  x   /  AST  77[H]  /  ALT  29  /  AlkPhos  54  06-06    LIVER FUNCTIONS - ( 06 Jun 2025 00:48 )  Alb: 4.2 g/dL / Pro: 6.9 g/dL / ALK PHOS: 54 U/L / ALT: 29 U/L / AST: 77 U/L / GGT: x           PT/INR - ( 06 Jun 2025 00:49 )   PT: 11.8 sec;   INR: 1.03 ratio         PTT - ( 06 Jun 2025 00:49 )  PTT:28.1 sec      Urinalysis Basic - ( 06 Jun 2025 00:48 )    Color: x / Appearance: x / SG: x / pH: x  Gluc: 95 mg/dL / Ketone: x  / Bili: x / Urobili: x   Blood: x / Protein: x / Nitrite: x   Leuk Esterase: x / RBC: x / WBC x   Sq Epi: x / Non Sq Epi: x / Bacteria: x      ======================Micro/Rad/Cardio=================  Telemetry: Reviewed   EKG: Reviewed  CXR: Reviewed  Culture: Reviewed   Echo:   Cath:          
Patient seen and examined at bedside.    Overnight Events:   NAEO   Denies acute complaints.   Sinus bradycardia with rates 50-60s. Occasional PVCs.   I/O with net UOP -2.3L.   Denies acute complaints this AM.     Current Meds:  acetaminophen     Tablet .. 650 milliGRAM(s) Oral every 6 hours PRN  amLODIPine   Tablet 10 milliGRAM(s) Oral daily  aspirin  chewable 81 milliGRAM(s) Oral daily  busPIRone 10 milliGRAM(s) Oral two times a day  carvedilol 3.125 milliGRAM(s) Oral every 12 hours  clopidogrel Tablet 75 milliGRAM(s) Oral daily  heparin   Injectable 5000 Unit(s) SubCutaneous every 8 hours  polyethylene glycol 3350 17 Gram(s) Oral daily  senna 2 Tablet(s) Oral at bedtime      Vitals:  T(F): 98.4 (06-08), Max: 98.4 (06-08)  HR: 63 (06-08) (58 - 70)  BP: 131/70 (06-08) (124/76 - 136/76)  RR: 17 (06-08)  SpO2: 97% (06-08)  I&O's Summary      Physical Exam:  Appearance: No acute distress; well appearing  Eyes: PERRL, EOMI, pink conjunctiva  HEENT: Normal oral mucosa  Cardiovascular: RRR, S1, S2, no murmurs, rubs, or gallops; no edema; no JVD  Respiratory: Clear to auscultation bilaterally  Gastrointestinal: soft, non-tender, non-distended with normal bowel sounds  Musculoskeletal: No clubbing; no joint deformity   Neurologic: Non-focal  Lymphatic: No lymphadenopathy  Psychiatry: AAOx3, mood & affect appropriate  Skin: No rashes, ecchymoses, or cyanosis      06-07    132[L]  |  99  |  15  ----------------------------<  98  4.6   |  22  |  0.91    Ca    9.3      07 Jun 2025 06:17  Phos  2.7     06-07  Mg     2.2     06-07                  
Patient is a 65y old  Male who presents with a chief complaint of Chest Pain (07 Jun 2025 08:33)      SUBJECTIVE / OVERNIGHT EVENTS:    feels well. no chest pain. sob, palpitations    tele SR, sinus bradycardia    ROS:  14 point ROS negative in detail except stated as above    MEDICATIONS  (STANDING):  amLODIPine   Tablet 10 milliGRAM(s) Oral daily  aspirin  chewable 81 milliGRAM(s) Oral daily  busPIRone 10 milliGRAM(s) Oral two times a day  carvedilol 3.125 milliGRAM(s) Oral every 12 hours  clopidogrel Tablet 75 milliGRAM(s) Oral daily  heparin   Injectable 5000 Unit(s) SubCutaneous every 8 hours  polyethylene glycol 3350 17 Gram(s) Oral daily  senna 2 Tablet(s) Oral at bedtime    MEDICATIONS  (PRN):  acetaminophen     Tablet .. 650 milliGRAM(s) Oral every 6 hours PRN Temp greater or equal to 38C (100.4F), Mild Pain (1 - 3)      CAPILLARY BLOOD GLUCOSE        I&O's Summary      PHYSICAL EXAM:  Vital Signs Last 24 Hrs  T(C): 36.9 (08 Jun 2025 04:00), Max: 36.9 (08 Jun 2025 04:00)  T(F): 98.4 (08 Jun 2025 04:00), Max: 98.4 (08 Jun 2025 04:00)  HR: 63 (08 Jun 2025 04:00) (58 - 70)  BP: 131/70 (08 Jun 2025 04:00) (124/76 - 136/76)  BP(mean): --  RR: 17 (08 Jun 2025 04:00) (17 - 18)  SpO2: 97% (08 Jun 2025 04:00) (96% - 97%)    Parameters below as of 08 Jun 2025 04:00  Patient On (Oxygen Delivery Method): room air      GENERAL: NAD, well-developed  HEAD:  Atraumatic, Normocephalic  EYES: EOMI, PERRL, conjunctiva and sclera clear  NECK: Supple, No JVD  CHEST/LUNG: Clear to auscultation bilaterally; No wheeze  HEART: Regular rate and rhythm; No murmurs, rubs, or gallops  ABDOMEN: Soft, Nontender, Nondistended; Bowel sounds present  EXTREMITIES:  2+ Peripheral Pulses, No clubbing, cyanosis, or edema  NEUROLOGY: AAOx3; non-focal  SKIN: No rashes or lesions    LABS:    06-07    132[L]  |  99  |  15  ----------------------------<  98  4.6   |  22  |  0.91    Ca    9.3      07 Jun 2025 06:17  Phos  2.7     06-07  Mg     2.2     06-07            Urinalysis Basic - ( 07 Jun 2025 06:17 )    Color: x / Appearance: x / SG: x / pH: x  Gluc: 98 mg/dL / Ketone: x  / Bili: x / Urobili: x   Blood: x / Protein: x / Nitrite: x   Leuk Esterase: x / RBC: x / WBC x   Sq Epi: x / Non Sq Epi: x / Bacteria: x        RADIOLOGY & ADDITIONAL TESTS:    Imaging Personally Reviewed:    Consultant(s) Notes Reviewed:      Care Discussed with Consultants/Other Providers:  
Patient is a 65y old  Male who presents with a chief complaint of Chest Pain (07 Jun 2025 05:43)    HPI:  "65 year old with PMHx of HTN and CAD (Stentx1 Cx in 4/2024) presented to [facility] with chest pain after power washing his house for the past 4 hours.  He indicated that he fel very hot and sweaty associated with the chest pain, he took ASA 325mg and presented to the ED. EKG was noted with MP, he was also loaded with plavix and heparin prior to transfer to [facility].  s/p LHC, with %, LAURYN x1 LM.  Admitted to CICU for further management.  (05 Jun 2025 19:15)"    HOSPITAL COURSE: Patient admitted to CICU for anterior STEMI. Patient s/p LHC with 100% LAD lesion s/p LAURYN to LM c/b by jailed circ s/p POBA. In the CICU also had episode of NSVT so started on Coreg. Downgraded on 6/6.    SUBJECTIVE / OVERNIGHT EVENTS: Patient seen and examined at bedside. No acute events overnight. Mild sinus prasanna on tele. No CP/SOB.    REVIEW OF SYSTEMS:   GEN: no night sweats or change in appetite  EYES: no changes in vision or diplopia   ENT: no epistaxis, sinus pain, gingival bleeding, odynophagia or dysphagia  CV: no CP, PND or palpitations  RESP: no cough, wheezing, or hemoptysis  GI: no hematemesis, hematochezia, or melena  : no dysuria, polyuria, or hematuria  MSK: no arthralgias or joint swelling   NEURO: no gross sensory changes, numbness, focal deficits  PSYCH: no depression or changes in concentration  HEME/ONC: no purpura, petechiae or night sweats  SKIN: no pruritus, hair loss or skin lesions  ALL: no photosensitivity, no complaints of anaphylaxis (SOB, throat swelling)      PAST MEDICAL & SURGICAL HISTORY:  HTN (hypertension)      Lyme disease      HLD (hyperlipidemia)      Aneurysm, ascending aorta      History of diverticulitis      History of BPH      Myocardial bridge      Hiatal hernia with GERD      CAD S/P percutaneous coronary angioplasty      No significant past surgical history          FAMILY HISTORY:  FH: HTN (hypertension) (Mother)    FH: HTN (hypertension) (Father)        Social History: Patient denies tobacco or IVDU.       MEDICATIONS  (STANDING):  amLODIPine   Tablet 10 milliGRAM(s) Oral daily  aspirin  chewable 81 milliGRAM(s) Oral daily  busPIRone 10 milliGRAM(s) Oral two times a day  carvedilol 3.125 milliGRAM(s) Oral every 12 hours  clopidogrel Tablet 75 milliGRAM(s) Oral daily  heparin   Injectable 5000 Unit(s) SubCutaneous every 8 hours  polyethylene glycol 3350 17 Gram(s) Oral daily  senna 2 Tablet(s) Oral at bedtime    MEDICATIONS  (PRN):  acetaminophen     Tablet .. 650 milliGRAM(s) Oral every 6 hours PRN Temp greater or equal to 38C (100.4F), Mild Pain (1 - 3)      CAPILLARY BLOOD GLUCOSE        I&O's Summary    05 Jun 2025 07:01  -  06 Jun 2025 07:00  --------------------------------------------------------  IN: 600 mL / OUT: 3125 mL / NET: -2525 mL    06 Jun 2025 07:01  -  07 Jun 2025 06:52  --------------------------------------------------------  IN: 480 mL / OUT: 600 mL / NET: -120 mL        PHYSICAL EXAM:  Vital Signs Last 24 Hrs  T(C): 36.6 (07 Jun 2025 05:45), Max: 37 (06 Jun 2025 07:00)  T(F): 97.9 (07 Jun 2025 05:45), Max: 98.6 (06 Jun 2025 07:00)  HR: 57 (07 Jun 2025 05:45) (56 - 66)  BP: 120/77 (07 Jun 2025 05:45) (101/65 - 131/58)  BP(mean): 91 (07 Jun 2025 05:45) (75 - 878)  RR: 18 (07 Jun 2025 05:45) (12 - 26)  SpO2: 96% (07 Jun 2025 05:45) (96% - 100%)    Parameters below as of 07 Jun 2025 05:45  Patient On (Oxygen Delivery Method): room air        GEN: male in NAD, appears comfortable, no diaphoresis  EYES: No scleral injection, PERRL, EOMI  ENTM: neck supple & symmetric without tracheal deviation, moist membranes, no gross hearing impairment, thyroid gland not enlarged  CV: +S1/S2, no m/r/g, no abdominal bruit, no LE edema  RESP: breathing comfortably, no respiratory accessory muscle use, CTAB, no w/r/r  GI: normoactive BS, soft, NTND, no rebounding/guarding, no palpable masses  LYMPHATICS: no LAD or tenderness to palpation  NEURO: AOx3, no focal deficits, CNII-XII grossly intact  PSYCH: No SI/HI/AVH, appropriate affect, appropriate insight/judgment   SKIN: no petechiae, ecchymosis or maculopapular rash noted    LABS:                        14.3   9.51  )-----------( 212      ( 06 Jun 2025 00:48 )             39.5     06-07    132[L]  |  99  |  15  ----------------------------<  98  4.6   |  22  |  0.91    Ca    9.3      07 Jun 2025 06:17  Phos  2.7     06-07  Mg     2.2     06-07    TPro  6.9  /  Alb  4.2  /  TBili  0.7  /  DBili  x   /  AST  77[H]  /  ALT  29  /  AlkPhos  54  06-06    PT/INR - ( 06 Jun 2025 00:49 )   PT: 11.8 sec;   INR: 1.03 ratio         PTT - ( 06 Jun 2025 00:49 )  PTT:28.1 sec      Urinalysis Basic - ( 07 Jun 2025 06:17 )    Color: x / Appearance: x / SG: x / pH: x  Gluc: 98 mg/dL / Ketone: x  / Bili: x / Urobili: x   Blood: x / Protein: x / Nitrite: x   Leuk Esterase: x / RBC: x / WBC x   Sq Epi: x / Non Sq Epi: x / Bacteria: x          RADIOLOGY & ADDITIONAL TESTS:  Results Reviewed:   Imaging Personally Reviewed:  Electrocardiogram Personally Reviewed:    COORDINATION OF CARE:  Care Discussed with Consultants/Other Providers [Y/N]:  Prior or Outpatient Records Reviewed [Y/N]:

## 2025-06-08 NOTE — PROGRESS NOTE ADULT - PROBLEM SELECTOR PLAN 2
- Stop home Hydralazine and Olmesartan-Amlodipine  - Discharge on Amlodipine  - OP follow up
- Stop home Hydralazine and Olmesartan-Amlodipine  - Discharge on Amlodipine  - OP follow up

## 2025-06-10 ENCOUNTER — APPOINTMENT (OUTPATIENT)
Dept: CT IMAGING | Facility: HOSPITAL | Age: 65
End: 2025-06-10
Payer: MEDICARE

## 2025-06-10 ENCOUNTER — OUTPATIENT (OUTPATIENT)
Dept: OUTPATIENT SERVICES | Facility: HOSPITAL | Age: 65
LOS: 1 days | End: 2025-06-10
Payer: MEDICARE

## 2025-06-10 DIAGNOSIS — A04.8 OTHER SPECIFIED BACTERIAL INTESTINAL INFECTIONS: ICD-10-CM

## 2025-06-10 DIAGNOSIS — Z12.11 ENCOUNTER FOR SCREENING FOR MALIGNANT NEOPLASM OF COLON: ICD-10-CM

## 2025-06-10 PROBLEM — I25.10 ATHEROSCLEROTIC HEART DISEASE OF NATIVE CORONARY ARTERY WITHOUT ANGINA PECTORIS: Chronic | Status: ACTIVE | Noted: 2025-06-05

## 2025-06-10 PROCEDURE — 74176 CT ABD & PELVIS W/O CONTRAST: CPT | Mod: MH

## 2025-06-10 PROCEDURE — 74176 CT ABD & PELVIS W/O CONTRAST: CPT | Mod: 26

## 2025-06-11 ENCOUNTER — TRANSCRIPTION ENCOUNTER (OUTPATIENT)
Age: 65
End: 2025-06-11

## 2025-06-26 ENCOUNTER — APPOINTMENT (OUTPATIENT)
Dept: ULTRASOUND IMAGING | Facility: HOSPITAL | Age: 65
End: 2025-06-26
Payer: MEDICARE

## 2025-06-26 ENCOUNTER — OUTPATIENT (OUTPATIENT)
Dept: OUTPATIENT SERVICES | Facility: HOSPITAL | Age: 65
LOS: 1 days | End: 2025-06-26
Payer: MEDICARE

## 2025-06-26 DIAGNOSIS — R94.5 ABNORMAL RESULTS OF LIVER FUNCTION STUDIES: ICD-10-CM

## 2025-06-26 PROCEDURE — 76705 ECHO EXAM OF ABDOMEN: CPT | Mod: 26

## 2025-06-26 PROCEDURE — 76705 ECHO EXAM OF ABDOMEN: CPT
